# Patient Record
Sex: MALE | Race: WHITE | Employment: FULL TIME | URBAN - METROPOLITAN AREA
[De-identification: names, ages, dates, MRNs, and addresses within clinical notes are randomized per-mention and may not be internally consistent; named-entity substitution may affect disease eponyms.]

---

## 2017-01-30 ENCOUNTER — OFFICE VISIT (OUTPATIENT)
Dept: INTERNAL MEDICINE CLINIC | Age: 62
End: 2017-01-30

## 2017-01-30 VITALS
HEIGHT: 69 IN | HEART RATE: 74 BPM | BODY MASS INDEX: 34.96 KG/M2 | OXYGEN SATURATION: 95 % | WEIGHT: 236 LBS | DIASTOLIC BLOOD PRESSURE: 76 MMHG | SYSTOLIC BLOOD PRESSURE: 120 MMHG

## 2017-01-30 DIAGNOSIS — K21.9 GASTROESOPHAGEAL REFLUX DISEASE WITHOUT ESOPHAGITIS: Primary | ICD-10-CM

## 2017-01-30 DIAGNOSIS — J30.9 ALLERGIC RHINITIS, UNSPECIFIED ALLERGIC RHINITIS TRIGGER, UNSPECIFIED RHINITIS SEASONALITY: ICD-10-CM

## 2017-01-30 PROCEDURE — 99213 OFFICE O/P EST LOW 20 MIN: CPT | Performed by: INTERNAL MEDICINE

## 2017-01-30 RX ORDER — LORATADINE 10 MG/1
10 CAPSULE, LIQUID FILLED ORAL DAILY
COMMUNITY
End: 2020-12-14 | Stop reason: ALTCHOICE

## 2017-01-30 ASSESSMENT — ENCOUNTER SYMPTOMS
ABDOMINAL DISTENTION: 0
CHEST TIGHTNESS: 0
DIARRHEA: 0
CONSTIPATION: 0
ABDOMINAL PAIN: 1
NAUSEA: 0
SHORTNESS OF BREATH: 0
BACK PAIN: 0
VOMITING: 0
COUGH: 1
RHINORRHEA: 1
WHEEZING: 0
SINUS PRESSURE: 0

## 2017-01-30 ASSESSMENT — PATIENT HEALTH QUESTIONNAIRE - PHQ9
SUM OF ALL RESPONSES TO PHQ9 QUESTIONS 1 & 2: 0
2. FEELING DOWN, DEPRESSED OR HOPELESS: 0
SUM OF ALL RESPONSES TO PHQ QUESTIONS 1-9: 0
1. LITTLE INTEREST OR PLEASURE IN DOING THINGS: 0

## 2018-02-15 ENCOUNTER — TELEPHONE (OUTPATIENT)
Dept: INTERNAL MEDICINE CLINIC | Age: 63
End: 2018-02-15

## 2018-02-15 DIAGNOSIS — R05.9 COUGH: ICD-10-CM

## 2018-02-15 RX ORDER — ALBUTEROL SULFATE 90 UG/1
2 AEROSOL, METERED RESPIRATORY (INHALATION) EVERY 6 HOURS PRN
Qty: 1 INHALER | Refills: 3 | Status: SHIPPED | OUTPATIENT
Start: 2018-02-15 | End: 2020-04-01 | Stop reason: SDUPTHER

## 2018-09-28 ENCOUNTER — TELEPHONE (OUTPATIENT)
Dept: FAMILY MEDICINE CLINIC | Age: 63
End: 2018-09-28

## 2018-09-28 NOTE — TELEPHONE ENCOUNTER
Pt is asking to see you as a new patient. His wife sees you. He can only do a Monday morning. Can we schedule him as a new patient on a Monday morning?

## 2018-12-03 ENCOUNTER — OFFICE VISIT (OUTPATIENT)
Dept: FAMILY MEDICINE CLINIC | Age: 63
End: 2018-12-03
Payer: COMMERCIAL

## 2018-12-03 VITALS
OXYGEN SATURATION: 96 % | DIASTOLIC BLOOD PRESSURE: 60 MMHG | HEART RATE: 74 BPM | BODY MASS INDEX: 35.25 KG/M2 | HEIGHT: 69 IN | WEIGHT: 238 LBS | SYSTOLIC BLOOD PRESSURE: 118 MMHG

## 2018-12-03 DIAGNOSIS — J45.20 MILD INTERMITTENT ASTHMA WITHOUT COMPLICATION: ICD-10-CM

## 2018-12-03 DIAGNOSIS — K21.9 GASTROESOPHAGEAL REFLUX DISEASE WITHOUT ESOPHAGITIS: ICD-10-CM

## 2018-12-03 DIAGNOSIS — R73.01 IFG (IMPAIRED FASTING GLUCOSE): Primary | ICD-10-CM

## 2018-12-03 DIAGNOSIS — K22.70 BARRETT'S ESOPHAGUS WITHOUT DYSPLASIA: ICD-10-CM

## 2018-12-03 DIAGNOSIS — Z12.5 SCREENING FOR PROSTATE CANCER: ICD-10-CM

## 2018-12-03 DIAGNOSIS — J30.81 ALLERGIC RHINITIS DUE TO ANIMAL HAIR AND DANDER: ICD-10-CM

## 2018-12-03 DIAGNOSIS — Z85.46 HISTORY OF PROSTATE CANCER: ICD-10-CM

## 2018-12-03 DIAGNOSIS — Z23 NEED FOR PROPHYLACTIC VACCINATION AND INOCULATION AGAINST VARICELLA: ICD-10-CM

## 2018-12-03 LAB
A/G RATIO: 1.7 (ref 1.1–2.2)
ALBUMIN SERPL-MCNC: 4.3 G/DL (ref 3.4–5)
ALP BLD-CCNC: 80 U/L (ref 40–129)
ALT SERPL-CCNC: 20 U/L (ref 10–40)
ANION GAP SERPL CALCULATED.3IONS-SCNC: 15 MMOL/L (ref 3–16)
AST SERPL-CCNC: 15 U/L (ref 15–37)
BILIRUB SERPL-MCNC: <0.2 MG/DL (ref 0–1)
BUN BLDV-MCNC: 14 MG/DL (ref 7–20)
CALCIUM SERPL-MCNC: 9.2 MG/DL (ref 8.3–10.6)
CHLORIDE BLD-SCNC: 106 MMOL/L (ref 99–110)
CHOLESTEROL, TOTAL: 207 MG/DL (ref 0–199)
CO2: 22 MMOL/L (ref 21–32)
CREAT SERPL-MCNC: 0.9 MG/DL (ref 0.8–1.3)
GFR AFRICAN AMERICAN: >60
GFR NON-AFRICAN AMERICAN: >60
GLOBULIN: 2.5 G/DL
GLUCOSE BLD-MCNC: 124 MG/DL (ref 70–99)
HBA1C MFR BLD: 6.1 %
HCT VFR BLD CALC: 46.4 % (ref 40.5–52.5)
HDLC SERPL-MCNC: 58 MG/DL (ref 40–60)
HEMOGLOBIN: 15.8 G/DL (ref 13.5–17.5)
LDL CHOLESTEROL CALCULATED: 130 MG/DL
MCH RBC QN AUTO: 33 PG (ref 26–34)
MCHC RBC AUTO-ENTMCNC: 34.1 G/DL (ref 31–36)
MCV RBC AUTO: 96.8 FL (ref 80–100)
PDW BLD-RTO: 12.9 % (ref 12.4–15.4)
PLATELET # BLD: 194 K/UL (ref 135–450)
PMV BLD AUTO: 9 FL (ref 5–10.5)
POTASSIUM SERPL-SCNC: 4.7 MMOL/L (ref 3.5–5.1)
PROSTATE SPECIFIC ANTIGEN: 0.02 NG/ML (ref 0–4)
RBC # BLD: 4.79 M/UL (ref 4.2–5.9)
SODIUM BLD-SCNC: 143 MMOL/L (ref 136–145)
TOTAL PROTEIN: 6.8 G/DL (ref 6.4–8.2)
TRIGL SERPL-MCNC: 94 MG/DL (ref 0–150)
VLDLC SERPL CALC-MCNC: 19 MG/DL
WBC # BLD: 7.7 K/UL (ref 4–11)

## 2018-12-03 PROCEDURE — 83036 HEMOGLOBIN GLYCOSYLATED A1C: CPT | Performed by: INTERNAL MEDICINE

## 2018-12-03 PROCEDURE — 99214 OFFICE O/P EST MOD 30 MIN: CPT | Performed by: INTERNAL MEDICINE

## 2018-12-03 ASSESSMENT — PATIENT HEALTH QUESTIONNAIRE - PHQ9
SUM OF ALL RESPONSES TO PHQ QUESTIONS 1-9: 0
SUM OF ALL RESPONSES TO PHQ QUESTIONS 1-9: 0
SUM OF ALL RESPONSES TO PHQ9 QUESTIONS 1 & 2: 0
1. LITTLE INTEREST OR PLEASURE IN DOING THINGS: 0
2. FEELING DOWN, DEPRESSED OR HOPELESS: 0

## 2018-12-03 ASSESSMENT — ENCOUNTER SYMPTOMS
SHORTNESS OF BREATH: 0
COUGH: 1
NAUSEA: 0
ABDOMINAL PAIN: 0
WHEEZING: 1
ABDOMINAL DISTENTION: 0

## 2019-06-11 ENCOUNTER — HOSPITAL ENCOUNTER (EMERGENCY)
Age: 64
Discharge: HOME | End: 2019-06-11
Payer: COMMERCIAL

## 2019-06-11 ENCOUNTER — APPOINTMENT (OUTPATIENT)
Dept: GENERAL RADIOLOGY | Age: 64
End: 2019-06-11
Payer: COMMERCIAL

## 2019-06-11 VITALS
DIASTOLIC BLOOD PRESSURE: 87 MMHG | OXYGEN SATURATION: 98 % | TEMPERATURE: 98.3 F | HEIGHT: 69 IN | SYSTOLIC BLOOD PRESSURE: 153 MMHG | HEART RATE: 71 BPM | RESPIRATION RATE: 18 BRPM | WEIGHT: 220 LBS | BODY MASS INDEX: 32.58 KG/M2

## 2019-06-11 DIAGNOSIS — M25.522 LEFT ELBOW PAIN: ICD-10-CM

## 2019-06-11 DIAGNOSIS — M79.602 LEFT ARM PAIN: Primary | ICD-10-CM

## 2019-06-11 DIAGNOSIS — M54.10 RADICULOPATHY, UNSPECIFIED SPINAL REGION: ICD-10-CM

## 2019-06-11 PROCEDURE — 6370000000 HC RX 637 (ALT 250 FOR IP): Performed by: NURSE PRACTITIONER

## 2019-06-11 PROCEDURE — 99283 EMERGENCY DEPT VISIT LOW MDM: CPT

## 2019-06-11 PROCEDURE — 73080 X-RAY EXAM OF ELBOW: CPT

## 2019-06-11 RX ORDER — HYDROCODONE BITARTRATE AND ACETAMINOPHEN 5; 325 MG/1; MG/1
1 TABLET ORAL EVERY 6 HOURS PRN
Qty: 6 TABLET | Refills: 0 | Status: SHIPPED | OUTPATIENT
Start: 2019-06-11 | End: 2019-06-14

## 2019-06-11 RX ORDER — HYDROCODONE BITARTRATE AND ACETAMINOPHEN 7.5; 325 MG/1; MG/1
1 TABLET ORAL ONCE
Status: COMPLETED | OUTPATIENT
Start: 2019-06-11 | End: 2019-06-11

## 2019-06-11 RX ORDER — METHOCARBAMOL 500 MG/1
500 TABLET, FILM COATED ORAL 3 TIMES DAILY PRN
Qty: 20 TABLET | Refills: 0 | Status: SHIPPED | OUTPATIENT
Start: 2019-06-11 | End: 2019-06-21

## 2019-06-11 RX ADMIN — HYDROCODONE BITARTRATE AND ACETAMINOPHEN 1 TABLET: 7.5; 325 TABLET ORAL at 09:39

## 2019-06-11 ASSESSMENT — PAIN DESCRIPTION - PROGRESSION: CLINICAL_PROGRESSION: GRADUALLY IMPROVING

## 2019-06-11 ASSESSMENT — PAIN DESCRIPTION - ORIENTATION
ORIENTATION: LEFT
ORIENTATION: LEFT

## 2019-06-11 ASSESSMENT — PAIN SCALES - GENERAL
PAINLEVEL_OUTOF10: 5
PAINLEVEL_OUTOF10: 8
PAINLEVEL_OUTOF10: 8

## 2019-06-11 ASSESSMENT — PAIN DESCRIPTION - ONSET: ONSET: ON-GOING

## 2019-06-11 ASSESSMENT — PAIN DESCRIPTION - LOCATION: LOCATION: ELBOW

## 2019-06-11 ASSESSMENT — PAIN DESCRIPTION - FREQUENCY: FREQUENCY: CONTINUOUS

## 2019-06-11 ASSESSMENT — PAIN DESCRIPTION - PAIN TYPE: TYPE: ACUTE PAIN

## 2019-06-11 NOTE — ED NOTES
Patient significant other asked this RN if she could call Dr. Salina Swanson office and get an MRI. This RN explained to patient and patients significant other the proper procedure is to go thru [facility] prior. Patient significant other stated she had already called Dr. Salina Swanson office and informed his office this was a workmans comp claim and was instructed the office would return her call. This RN reiterated to follow up with [facility], call physical therapy for an appt. And follow up with patients workplace for further instructions. Patient and family verbalized understanding.      Jessica Lynch RN  06/11/19 [id number]

## 2019-06-11 NOTE — ED PROVIDER NOTES
[facility] Emergency Department    CHIEF COMPLAINT  Arm Pain (pt was lifting something last night and felt multiple pops in the left elbow- numbness pain and tingling from wrist to shoulder. )      HISTORY OF PRESENT ILLNESS  Anuradha Slaughter is a 60 y.o. male who presents to the ED complaining of left arm pain. Patient reports he was at work last evening lifting a bar over his head when he heard 3 \"pops\" in his left elbow. Patient reports since then he has been having a sharp shooting pain from his left wrist to his left shoulder. Patient also reports numbness, tingling, weakness in the left hand and fingers. Patient denies any other injury. Patient denies any aggravating or alleviating factors. Patient did not take anything for pain prior to arrival.  No other complaints, modifying factors or associated symptoms. Nursing notes reviewed. Past Medical History:   Diagnosis Date    Arevalo's esophagus 2010, 2014    EGD 9/10 Dr. Frederick Dykes GERD (gastroesophageal reflux disease)     Hiatal hernia     Hyperlipidemia     past hx    Kidney stone 2001    Prostate cancer [facility])     Urology group Dr. Bruce Ramos, s/p radioactive seed implants. also following with oncology. Past Surgical History:   Procedure Laterality Date    COLONOSCOPY  9/10    KIDNEY STONE SURGERY      PROSTATE SURGERY  approx 2011    prostate biopsy, radiation seeds    SHOULDER ARTHROSCOPY Right 12-2-15    RT SHOULDER DVA; SAD; ROTATOR CUFF REPAIR; BICEPS TENODESIS     UPPER GASTROINTESTINAL ENDOSCOPY  2012, 2014    Dr. Blank Acharya.   repeat 2017     Family History   Problem Relation Age of Onset    Cancer Mother         breast    Diabetes Mother     Diabetes Father     Hypertension Father     Parkinsonism Other      Social History     Socioeconomic History    Marital status:      Spouse name: Not on file    Number of children: Not on file    Years of education: Not on file    Highest education level: Not on file   Occupational History    Not on file   Social Needs    Financial resource strain: Not on file    Food insecurity:     Worry: Not on file     Inability: Not on file    Transportation needs:     Medical: Not on file     Non-medical: Not on file   Tobacco Use    Smoking status: Former Smoker     Packs/day: 1.00     Years: 20.00     Pack years: 20.00     Types: Cigarettes     Last attempt to quit: 1990     Years since quittin.5    Smokeless tobacco: Never Used   Substance and Sexual Activity    Alcohol use: Yes     Comment: 7-14 drinks per week    Drug use: No    Sexual activity: Not on file   Lifestyle    Physical activity:     Days per week: Not on file     Minutes per session: Not on file    Stress: Not on file   Relationships    Social connections:     Talks on phone: Not on file     Gets together: Not on file     Attends Bahai service: Not on file     Active member of club or organization: Not on file     Attends meetings of clubs or organizations: Not on file     Relationship status: Not on file    Intimate partner violence:     Fear of current or ex partner: Not on file     Emotionally abused: Not on file     Physically abused: Not on file     Forced sexual activity: Not on file   Other Topics Concern    Not on file   Social History Narrative    Not on file     No current facility-administered medications for this encounter. Current Outpatient Medications   Medication Sig Dispense Refill    methocarbamol (ROBAXIN) 500 MG tablet Take 1 tablet by mouth 3 times daily as needed (pain) 20 tablet 0    HYDROcodone-acetaminophen (NORCO) 5-325 MG per tablet Take 1 tablet by mouth every 6 hours as needed for Pain for up to 3 days. Intended supply: 3 days. Take lowest dose possible to manage pain 6 tablet 0    omeprazole (PRILOSEC) 40 MG capsule Take 40 mg by mouth daily.         albuterol sulfate  (90 Base) MCG/ACT inhaler Inhale 2 puffs into the lungs every 6 hours as needed for Wheezing or Shortness of Breath 1 Inhaler 3    loratadine (CLARITIN) 10 MG capsule Take 10 mg by mouth daily      B Complex-C (SUPER B COMPLEX PO) Take by mouth daily      CALCIUM-VITAMIN D PO Take by mouth daily      UNABLE TO FIND Take by mouth daily Reversitrol plus      folic acid (FOLVITE) 539 MCG tablet Take 400 mcg by mouth daily       Chromium Picolinate 500 MCG TABS Take 1 tablet by mouth daily.  Cinnamon 500 MG CAPS Take 2 capsules by mouth Daily        No Known Allergies    REVIEW OF SYSTEMS  6 systems reviewed, pertinent positives per HPI otherwise noted to be negative    PHYSICAL EXAM  BP (!) 153/87   Pulse 71   Temp 98.3 °F (36.8 °C) (Oral)   Resp 18   Ht 5' 8.5\" (1.74 m)   Wt 220 lb (99.8 kg)   SpO2 98%   BMI 32.96 kg/m²   GENERAL APPEARANCE: Awake and alert. Cooperative. No acute distress. HEAD: Normocephalic. Atraumatic. EYES: PERRL. EOM's grossly intact. ENT: Mucous membranes are moist.   NECK: Supple. Normal ROM. CHEST: Equal symmetric chest rise. LUNGS: Breathing is unlabored. Speaking comfortably in full sentences. Abdomen: Nondistended  EXTREMITIES: MAEE. No acute deformities. Left upper extremity no obvious deformity. No erythema, ecchymosis, edema, effusion. No abrasion or laceration. Pain is reproduced with palpation to the ulna medially. Strength is decreased to the left hand. Sensation is intact. Patient able to perform finger to thumb opposition. Ulnar, radial, medial nerves are intact. Patient able to perform active and passive range of motion. Cap refill less than 2 seconds. Distal pulses intact. Neurovascularly intact. SKIN: Warm and dry. NEUROLOGICAL: Alert and oriented. Strength is 4/5 in all extremities and sensation is intact. RADIOLOGY  Xr Elbow Left (min 3 Views)    Result Date: 6/11/2019  EXAMINATION: 3 XRAY VIEWS OF THE LEFT ELBOW 6/11/2019 9:11 am COMPARISON: None.  HISTORY: ORDERING SYSTEM PROVIDED HISTORY: hear a \"pop\" pain TECHNOLOGIST PROVIDED HISTORY: Reason for exam:->hear a \"pop\" pain 80-year-old female who heard a pop and has acute left elbow pain FINDINGS: Osseous alignment is normal.  Joint spaces are well maintained. No acute fracture or gross dislocation is seen. The radial head and radial neck appear intact. There is no significant elevation of the posterior fat pad or sail sign to suggest a joint effusion. No acute fracture or gross dislocation. ED COURSE  I have independently evaluated this patient per my scope of practice. My supervising physician was in the department as needed for consultation. Patient presents emergency department for evaluation of left elbow pain. X-ray of left elbow reveals no acute fracture or gross dislocation. Based on history of injury and patient's physical exam I believe patient is having radicular pain after an elbow sprain. Patient provided with [facility] for follow-up. Patient given Sommer Said in the emergency department. Strict return precautions discussed. Patient instructed to return to the emergency department with new or worsening symptoms. Patient is agreeable with plan of care and denies any questions at this time. Patient was sent home with a prescription for robaxin and norco.    MDM    No results found for this visit on 06/11/19. I estimate there is LOW risk for COMPARTMENT SYNDROME, DEEP VENOUS THROMBOSIS, SEPTIC ARTHRITIS, TENDON OR NEUROVASCULAR INJURY, thus I consider the discharge disposition reasonable. Nikki Murguia and I have discussed the diagnosis and risks, and we agree with discharging home to follow-up with their primary doctor or the referral orthopedist. We also discussed returning to the Emergency Department immediately if new or worsening symptoms occur.  We have discussed the symptoms which are most concerning (e.g., changing or worsening pain, numbness, weakness) that necessitate immediate return. Final Impression    1. Left arm pain    2. Radiculopathy, unspecified spinal region    3. Left elbow pain        Blood pressure (!) 153/87, pulse 71, temperature 98.3 °F (36.8 °C), temperature source Oral, resp. rate 18, height 5' 8.5\" (1.74 m), weight 220 lb (99.8 kg), SpO2 98 %. DISPOSITION  Patient was discharged to home in good condition.        Consuelo Gama, ANNY - ALONZO  06/11/19 1000

## 2019-06-13 ENCOUNTER — OFFICE VISIT (OUTPATIENT)
Dept: ORTHOPEDIC SURGERY | Age: 64
End: 2019-06-13
Payer: COMMERCIAL

## 2019-06-13 VITALS — HEIGHT: 69 IN | WEIGHT: 220.02 LBS | BODY MASS INDEX: 32.59 KG/M2

## 2019-06-13 DIAGNOSIS — S46.212A STRAIN OF LEFT BICEPS, INITIAL ENCOUNTER: ICD-10-CM

## 2019-06-13 DIAGNOSIS — S46.212A RUPTURE OF LEFT DISTAL BICEPS TENDON, INITIAL ENCOUNTER: Primary | ICD-10-CM

## 2019-06-13 PROCEDURE — 99243 OFF/OP CNSLTJ NEW/EST LOW 30: CPT | Performed by: ORTHOPAEDIC SURGERY

## 2019-06-13 NOTE — PROGRESS NOTES
SHOULDER CONSULTATION    Referring Provider: Dr. Amrit Gloria    Primary Care Provider: Same    Chief Complaint    Elbow Pain (LEFT ELBOW)      History of Present Illness:  Nicci Johnson is a 60 y.o. male who is right-hand dominant but works ambidextrous. He does have a history of some rotator cuff problems in the past.  On Monday he was using a large heavy pole holding his hand supinated and extended. He felt a pop in the antecubital fossa. In fact he describes 3 distinct pops. The feeling of getting hit by hammer. At that time he had trouble flexing and supinating the elbow. He had vague numbness and tingling in the forearm. Symptoms are somewhat improving but he remains weak and concerned    Pain Assessment  Location of Pain: Elbow  Location Modifiers: Left  Severity of Pain: 8    Medical History:  Patient's medications, allergies, past medical, surgical, social and family histories were reviewed and updated as appropriate. Review of Systems:  Pertinent items are noted in HPI  Review of systems reviewed from Patient History Form dated on June 13, 2019 and available in the patient's chart under the Media tab. Vital Signs:  Ht 5' 8.5\" (1.74 m)   Wt 220 lb 0.3 oz (99.8 kg)   BMI 32.96 kg/m²       General Exam:   Constitutional: Patient is adequately groomed with no evidence of malnutrition  Mental Status: The patient is oriented to time, place and person. The patient's mood and affect are appropriate. Vascular: Examination reveals no swelling or calf tenderness. Peripheral pulses are palpable and 2+. Neurological: The patient has good coordination. There is no weakness or sensory deficit.     Elbow examination:    Inspection: He has swelling in the antecubital fossa with deformity of the distal biceps muscle contour    Palpation: Tenderness in the antecubital fossa and proximal radius    Range of Motion: Full joint range of motion and pronation, supination, flexion and extension    Strength: Pain inhibited flexion and supination strength    Special Tests: Positive hook test    Skin: There are no rashes, ulcerations or lesions. Gait: Stable with no assistive device     Spine full cervical rotation    Additional Comments:         Radiology:     Plain x-rays obtained and reviewed in the office today include 3 views of the elbow. This reveals normal osseous alignment. No fractures or malalignment      Assessment : My clinical impression is that of a distal biceps rupture. Office Procedures:  No orders of the defined types were placed in this encounter. Treatment Plan: We explained the anatomy and mechanics of the elbow. I have recommended that we proceed with an MRI to confirm the diagnosis or assess a partial-thickness tear. This will help direct his plan of treatment and prognosis. Presently he is unable to perform his job and will remain off work. We appreciate the opportunity to care for this patient. The trust that is implicit in this referral does not go unnoticed. We will do our very best to provide high-quality care that goes above and beyond standards. Please feel free contact us with any questions or concerns about this patient.               [address] [facility] Shane Partner of [facility] and Sports Medicine Surgery

## 2019-06-14 ENCOUNTER — HOSPITAL ENCOUNTER (OUTPATIENT)
Dept: MRI IMAGING | Age: 64
Discharge: HOME | End: 2019-06-14
Payer: COMMERCIAL

## 2019-06-14 DIAGNOSIS — S46.212A RUPTURE OF LEFT DISTAL BICEPS TENDON, INITIAL ENCOUNTER: ICD-10-CM

## 2019-06-14 PROCEDURE — 73221 MRI JOINT UPR EXTREM W/O DYE: CPT

## 2019-06-18 ENCOUNTER — OFFICE VISIT (OUTPATIENT)
Dept: ORTHOPEDIC SURGERY | Age: 64
End: 2019-06-18
Payer: COMMERCIAL

## 2019-06-18 VITALS — WEIGHT: 220 LBS | HEIGHT: 69 IN | BODY MASS INDEX: 32.58 KG/M2

## 2019-06-18 DIAGNOSIS — S46.212A RUPTURE OF LEFT DISTAL BICEPS TENDON, INITIAL ENCOUNTER: Primary | ICD-10-CM

## 2019-06-18 PROCEDURE — 99213 OFFICE O/P EST LOW 20 MIN: CPT | Performed by: ORTHOPAEDIC SURGERY

## 2019-06-18 PROCEDURE — L3670 SO ACRO/CLAV CAN WEB PRE OTS: HCPCS | Performed by: ORTHOPAEDIC SURGERY

## 2019-06-18 NOTE — LETTER
[facility] [facility]   Surgery Precert & Billing Form:    DEMOGRAPHICS:                                                                                                       Patient Name:  Mercedes Tineo  Patient :  1955   Patient SS#:      Patient Phone:  [phone] (home) [phone] (work) Alt. Patient Phone:    Patient Address:  [place] [id number]    PCP:  Alfred Lima MD  Insurance: Payor: [employer] / Plan: [employer] / Product Type: *No Product type* /   DIAGNOSIS & PROCEDURE:                                                                                      Diagnosis:   1.  Rupture of left distal biceps tendon, initial encounter      Operation: left    SURGERY  INFORMATION  Date of Surgery:   19  Location:  [facility]   Type:    Outpatient  23 hour hold:  No  Surgeon:              John Bateman MD      19     BILLING INFORMATION:                                                                                                Physician Procedure                                            CPT Codes        Left distal bicep repair  96410                PA, or Fellow Procedure                                      CPT Codes                           Precert information:
? Cefazolin 3 grams if ?120kg (?264 lbs. )  ? Pediatric(<12yo) Dosinmg/kg (maximum 2 grams)   If allergic to PCN/Cephalosporin, positive MRSA/history or ? 72  age (risk of C-difficile):       [] Vancomycin IVPB per weight base protocol  ? Vancomycin 1 gm if < 80kg (<176 lbs. )  ? Vancomycin 1.5 gm if ?80kg to <120kg (?176 to <264 lbs. )  ? Vancomycin 2 gm if  if ?120kg (?264 lbs.)   ADDITIONAL MEDICATIONS:    [x] OFIRMEV IVPB 1gm over 15 minutes (Adjust to body weight when needed)       Administer in pre-op. []  EXPAREL 1.3%  20 cc  Subcutaneous    **ANDREA AND ISAK ONLY**  []  Ortho Irrigation: Bacitracin 50,999 units and Polymixin B 500,000 unites mixed in a syringe.  OR to mix with 500 cc NS and use 200 cc for irrigation (FOR ALL PATIENTS WHO REQUIRED METAL IMPLANT OR GRAFT)   DVT PREVENTION:  Antithrombic wraps (Age 16 & older)  [] Thigh High  []  Knee high            []Bilateral    [] Right     []  Left  [x]  Knee high DANIEL Hose           Signature                                                 Date 19 1:44 PM                                             Mir Mathews M.D                                                                                                 Scheduled By:  Lyric Campbell 19   Direct Tel: [phone]                                      Direct Fax: [phone] Office: [phone] Ext [phone]

## 2019-06-21 ENCOUNTER — OFFICE VISIT (OUTPATIENT)
Dept: FAMILY MEDICINE CLINIC | Age: 64
End: 2019-06-21
Payer: COMMERCIAL

## 2019-06-21 VITALS
TEMPERATURE: 98.1 F | OXYGEN SATURATION: 98 % | SYSTOLIC BLOOD PRESSURE: 106 MMHG | BODY MASS INDEX: 35.96 KG/M2 | DIASTOLIC BLOOD PRESSURE: 64 MMHG | RESPIRATION RATE: 16 BRPM | HEART RATE: 83 BPM | HEIGHT: 69 IN | WEIGHT: 242.8 LBS

## 2019-06-21 DIAGNOSIS — Z01.818 PRE-OP EXAM: Primary | ICD-10-CM

## 2019-06-21 DIAGNOSIS — S46.212S RUPTURE OF LEFT BICEPS TENDON, SEQUELA: ICD-10-CM

## 2019-06-21 DIAGNOSIS — R73.01 IMPAIRED FASTING GLUCOSE: ICD-10-CM

## 2019-06-21 LAB
ALBUMIN SERPL-MCNC: 4.6 G/DL (ref 3.4–5)
ANION GAP SERPL CALCULATED.3IONS-SCNC: 12 MMOL/L (ref 3–16)
BUN BLDV-MCNC: 19 MG/DL (ref 7–20)
CALCIUM SERPL-MCNC: 9.8 MG/DL (ref 8.3–10.6)
CHLORIDE BLD-SCNC: 103 MMOL/L (ref 99–110)
CO2: 25 MMOL/L (ref 21–32)
CREAT SERPL-MCNC: 1 MG/DL (ref 0.8–1.3)
GFR AFRICAN AMERICAN: >60
GFR NON-AFRICAN AMERICAN: >60
GLUCOSE BLD-MCNC: 143 MG/DL (ref 70–99)
HCT VFR BLD CALC: 46.7 % (ref 40.5–52.5)
HEMOGLOBIN: 16.2 G/DL (ref 13.5–17.5)
MCH RBC QN AUTO: 33.7 PG (ref 26–34)
MCHC RBC AUTO-ENTMCNC: 34.7 G/DL (ref 31–36)
MCV RBC AUTO: 97 FL (ref 80–100)
PDW BLD-RTO: 13 % (ref 12.4–15.4)
PHOSPHORUS: 3.8 MG/DL (ref 2.5–4.9)
PLATELET # BLD: 208 K/UL (ref 135–450)
PMV BLD AUTO: 8.9 FL (ref 5–10.5)
POTASSIUM SERPL-SCNC: 4.4 MMOL/L (ref 3.5–5.1)
RBC # BLD: 4.81 M/UL (ref 4.2–5.9)
SODIUM BLD-SCNC: 140 MMOL/L (ref 136–145)
WBC # BLD: 10.2 K/UL (ref 4–11)

## 2019-06-21 PROCEDURE — 93000 ELECTROCARDIOGRAM COMPLETE: CPT | Performed by: PHYSICIAN ASSISTANT

## 2019-06-21 PROCEDURE — 99213 OFFICE O/P EST LOW 20 MIN: CPT | Performed by: PHYSICIAN ASSISTANT

## 2019-06-21 ASSESSMENT — PATIENT HEALTH QUESTIONNAIRE - PHQ9
SUM OF ALL RESPONSES TO PHQ9 QUESTIONS 1 & 2: 0
SUM OF ALL RESPONSES TO PHQ QUESTIONS 1-9: 0
2. FEELING DOWN, DEPRESSED OR HOPELESS: 0
1. LITTLE INTEREST OR PLEASURE IN DOING THINGS: 0
SUM OF ALL RESPONSES TO PHQ QUESTIONS 1-9: 0

## 2019-06-21 NOTE — PROGRESS NOTES
(gastroesophageal reflux disease)    Shoulder impingement    Biceps muscle tear    Rotator cuff tear    Mild intermittent asthma without complication    Allergic rhinitis due to animal hair and dander    IFG (impaired fasting glucose)       Past Medical History:   Diagnosis Date    Arevalo's esophagus ,     EGD 9/10 Dr. Cesilia Harrell GERD (gastroesophageal reflux disease)     Hiatal hernia     Hyperlipidemia     past hx    Kidney stone     Prostate cancer [facility])     Urology group Dr. Jorge L Epstein, s/p radioactive seed implants. also following with oncology. Past Surgical History:   Procedure Laterality Date    COLONOSCOPY  9/10    KIDNEY STONE SURGERY      PROSTATE SURGERY  approx     prostate biopsy, radiation seeds    SHOULDER ARTHROSCOPY Right 12-2-15    RT SHOULDER DVA; SAD; ROTATOR CUFF REPAIR; BICEPS TENODESIS     UPPER GASTROINTESTINAL ENDOSCOPY  ,     Dr. Nayely Fleming.   repeat 2017     Family History   Problem Relation Age of Onset    Cancer Mother         breast    Diabetes Mother     Diabetes Father     Hypertension Father     Parkinsonism Other      Social History     Socioeconomic History    Marital status:      Spouse name: Not on file    Number of children: Not on file    Years of education: Not on file    Highest education level: Not on file   Occupational History    Not on file   Social Needs    Financial resource strain: Not on file    Food insecurity:     Worry: Not on file     Inability: Not on file    Transportation needs:     Medical: Not on file     Non-medical: Not on file   Tobacco Use    Smoking status: Former Smoker     Packs/day: 1.00     Years: 20.00     Pack years: 20.00     Types: Cigarettes     Last attempt to quit: 1990     Years since quittin.5    Smokeless tobacco: Never Used   Substance and Sexual Activity    Alcohol use: Yes     Comment: 7-14 drinks per week    Drug use: No    Sexual activity: Not on file   Lifestyle  Physical activity:     Days per week: Not on file     Minutes per session: Not on file    Stress: Not on file   Relationships    Social connections:     Talks on phone: Not on file     Gets together: Not on file     Attends Alevism service: Not on file     Active member of club or organization: Not on file     Attends meetings of clubs or organizations: Not on file     Relationship status: Not on file    Intimate partner violence:     Fear of current or ex partner: Not on file     Emotionally abused: Not on file     Physically abused: Not on file     Forced sexual activity: Not on file   Other Topics Concern    Not on file   Social History Narrative    Not on file       Review of Systems  A comprehensive review of systems was negative except for what was noted in the HPI. Physical Exam   Constitutional: He is oriented to person, place, and time. He appears well-developed and well-nourished. No distress. HENT:   Head: Normocephalic and atraumatic. Mouth/Throat: Uvula is midline, oropharynx is clear and moist and mucous membranes are normal.   Eyes: Conjunctivae and EOM are normal. Pupils are equal, round, and reactive to light. Neck: Trachea normal and normal range of motion. Neck supple. No JVD present. Carotid bruit is not present. No mass and no thyromegaly present. Cardiovascular: Normal rate, regular rhythm, normal heart sounds and intact distal pulses. Exam reveals no gallop and no friction rub. No murmur heard. Pulmonary/Chest: Effort normal and breath sounds normal. No respiratory distress. He has no wheezes. He has no rales. Abdominal: Soft. Normal aorta and bowel sounds are normal. He exhibits no distension and no mass. There is no hepatosplenomegaly. No tenderness. Musculoskeletal: He exhibits no edema and no tenderness. Neurological: He is alert and oriented to person, place, and time. He has normal strength. No cranial nerve deficit or sensory deficit.  Coordination and gait normal.   Skin: Skin is warm and dry. No rash noted. No erythema. Psychiatric: He has a normal mood and affect. His behavior is normal.     EKG Interpretation:  normal EKG, normal sinus rhythm, unchanged from previous tracings. Lab Review   No visits with results within 6 Month(s) from this visit. Latest known visit with results is:   Office Visit on 12/03/2018   Component Date Value    Hemoglobin A1C 12/03/2018 6.1     PSA 12/03/2018 0.02     WBC 12/03/2018 7.7     RBC 12/03/2018 4.79     Hemoglobin 12/03/2018 15.8     Hematocrit 12/03/2018 46.4     MCV 12/03/2018 96.8     MCH 12/03/2018 33.0     MCHC 12/03/2018 34.1     RDW 12/03/2018 12.9     Platelets 54/91/2535 194     MPV 12/03/2018 9.0     Cholesterol, Total 12/03/2018 207*    Triglycerides 12/03/2018 94     HDL 12/03/2018 58     LDL Calculated 12/03/2018 130*    VLDL Cholesterol Calcula* 12/03/2018 19     Sodium 12/03/2018 143     Potassium 12/03/2018 4.7     Chloride 12/03/2018 106     CO2 12/03/2018 22     Anion Gap 12/03/2018 15     Glucose 12/03/2018 124*    BUN 12/03/2018 14     CREATININE 12/03/2018 0.9     GFR Non- 12/03/2018 >60     GFR  12/03/2018 >60     Calcium 12/03/2018 9.2     Total Protein 12/03/2018 6.8     Alb 12/03/2018 4.3     Albumin/Globulin Ratio 12/03/2018 1.7     Total Bilirubin 12/03/2018 <0.2     Alkaline Phosphatase 12/03/2018 80     ALT 12/03/2018 20     AST 12/03/2018 15     Globulin 12/03/2018 2.5            Assessment:       60 y.o. patient with planned surgery as above. Known risk factors for perioperative complications: None  Current medications which may produce withdrawal symptoms if withheld perioperatively: none      Plan:     1. Preoperative workup as follows: ECG, hemoglobin, hematocrit, electrolytes, creatinine, glucose  2.  Change in medication regimen before surgery: Take omeprazole on morning of surgery with sip of water, and hold all

## 2019-06-22 LAB
ESTIMATED AVERAGE GLUCOSE: 142.7 MG/DL
HBA1C MFR BLD: 6.6 %

## 2019-06-25 ENCOUNTER — ANESTHESIA EVENT (OUTPATIENT)
Dept: OPERATING ROOM | Age: 64
End: 2019-06-25
Payer: COMMERCIAL

## 2019-06-26 ENCOUNTER — ANESTHESIA (OUTPATIENT)
Dept: OPERATING ROOM | Age: 64
End: 2019-06-26
Payer: COMMERCIAL

## 2019-06-26 ENCOUNTER — HOSPITAL ENCOUNTER (OUTPATIENT)
Age: 64
Setting detail: OUTPATIENT SURGERY
Discharge: HOME | End: 2019-06-26
Attending: ORTHOPAEDIC SURGERY | Admitting: ORTHOPAEDIC SURGERY
Payer: COMMERCIAL

## 2019-06-26 VITALS
WEIGHT: 242 LBS | TEMPERATURE: 97.7 F | HEIGHT: 69 IN | SYSTOLIC BLOOD PRESSURE: 111 MMHG | DIASTOLIC BLOOD PRESSURE: 57 MMHG | HEART RATE: 71 BPM | RESPIRATION RATE: 24 BRPM | BODY MASS INDEX: 35.84 KG/M2 | OXYGEN SATURATION: 92 %

## 2019-06-26 VITALS
DIASTOLIC BLOOD PRESSURE: 72 MMHG | TEMPERATURE: 97.7 F | RESPIRATION RATE: 2 BRPM | SYSTOLIC BLOOD PRESSURE: 154 MMHG | OXYGEN SATURATION: 96 %

## 2019-06-26 DIAGNOSIS — S46.212A TEAR OF LEFT BICEPS MUSCLE, INITIAL ENCOUNTER: Primary | ICD-10-CM

## 2019-06-26 PROCEDURE — 6360000002 HC RX W HCPCS: Performed by: NURSE ANESTHETIST, CERTIFIED REGISTERED

## 2019-06-26 PROCEDURE — C1713 ANCHOR/SCREW BN/BN,TIS/BN: HCPCS | Performed by: ORTHOPAEDIC SURGERY

## 2019-06-26 PROCEDURE — 2709999900 HC NON-CHARGEABLE SUPPLY: Performed by: ORTHOPAEDIC SURGERY

## 2019-06-26 PROCEDURE — 7100000000 HC PACU RECOVERY - FIRST 15 MIN: Performed by: ORTHOPAEDIC SURGERY

## 2019-06-26 PROCEDURE — 7100000010 HC PHASE II RECOVERY - FIRST 15 MIN: Performed by: ORTHOPAEDIC SURGERY

## 2019-06-26 PROCEDURE — 6360000002 HC RX W HCPCS: Performed by: ORTHOPAEDIC SURGERY

## 2019-06-26 PROCEDURE — 2500000003 HC RX 250 WO HCPCS: Performed by: ANESTHESIOLOGY

## 2019-06-26 PROCEDURE — 6360000002 HC RX W HCPCS: Performed by: ANESTHESIOLOGY

## 2019-06-26 PROCEDURE — 3700000000 HC ANESTHESIA ATTENDED CARE: Performed by: ORTHOPAEDIC SURGERY

## 2019-06-26 PROCEDURE — 7100000011 HC PHASE II RECOVERY - ADDTL 15 MIN: Performed by: ORTHOPAEDIC SURGERY

## 2019-06-26 PROCEDURE — 3700000001 HC ADD 15 MINUTES (ANESTHESIA): Performed by: ORTHOPAEDIC SURGERY

## 2019-06-26 PROCEDURE — 3600000003 HC SURGERY LEVEL 3 BASE: Performed by: ORTHOPAEDIC SURGERY

## 2019-06-26 PROCEDURE — 7100000001 HC PACU RECOVERY - ADDTL 15 MIN: Performed by: ORTHOPAEDIC SURGERY

## 2019-06-26 PROCEDURE — 2580000003 HC RX 258: Performed by: ORTHOPAEDIC SURGERY

## 2019-06-26 PROCEDURE — 64415 NJX AA&/STRD BRCH PLXS IMG: CPT | Performed by: ANESTHESIOLOGY

## 2019-06-26 PROCEDURE — 2580000003 HC RX 258: Performed by: ANESTHESIOLOGY

## 2019-06-26 PROCEDURE — 3600000013 HC SURGERY LEVEL 3 ADDTL 15MIN: Performed by: ORTHOPAEDIC SURGERY

## 2019-06-26 PROCEDURE — 2500000003 HC RX 250 WO HCPCS: Performed by: NURSE ANESTHETIST, CERTIFIED REGISTERED

## 2019-06-26 DEVICE — SYSTEM IMPL DST BICEPS REP DEL W/ BICEPS BTTN 7X10MM PEEK: Type: IMPLANTABLE DEVICE | Site: ARM | Status: FUNCTIONAL

## 2019-06-26 RX ORDER — MIDAZOLAM HYDROCHLORIDE 1 MG/ML
INJECTION INTRAMUSCULAR; INTRAVENOUS PRN
Status: DISCONTINUED | OUTPATIENT
Start: 2019-06-26 | End: 2019-06-26 | Stop reason: SDUPTHER

## 2019-06-26 RX ORDER — PROMETHAZINE HYDROCHLORIDE 25 MG/ML
6.25 INJECTION, SOLUTION INTRAMUSCULAR; INTRAVENOUS
Status: DISCONTINUED | OUTPATIENT
Start: 2019-06-26 | End: 2019-06-26 | Stop reason: HOSPADM

## 2019-06-26 RX ORDER — OXYCODONE HYDROCHLORIDE AND ACETAMINOPHEN 5; 325 MG/1; MG/1
1 TABLET ORAL EVERY 6 HOURS PRN
Qty: 28 TABLET | Refills: 0 | Status: SHIPPED | OUTPATIENT
Start: 2019-06-26 | End: 2019-07-03

## 2019-06-26 RX ORDER — MIDAZOLAM HYDROCHLORIDE 1 MG/ML
INJECTION INTRAMUSCULAR; INTRAVENOUS
Status: COMPLETED
Start: 2019-06-26 | End: 2019-06-26

## 2019-06-26 RX ORDER — LIDOCAINE HYDROCHLORIDE 10 MG/ML
0.3 INJECTION, SOLUTION EPIDURAL; INFILTRATION; INTRACAUDAL; PERINEURAL
Status: DISCONTINUED | OUTPATIENT
Start: 2019-06-26 | End: 2019-06-26 | Stop reason: HOSPADM

## 2019-06-26 RX ORDER — MEPERIDINE HYDROCHLORIDE 50 MG/ML
12.5 INJECTION INTRAMUSCULAR; INTRAVENOUS; SUBCUTANEOUS EVERY 5 MIN PRN
Status: DISCONTINUED | OUTPATIENT
Start: 2019-06-26 | End: 2019-06-26 | Stop reason: HOSPADM

## 2019-06-26 RX ORDER — DIPHENHYDRAMINE HYDROCHLORIDE 50 MG/ML
12.5 INJECTION INTRAMUSCULAR; INTRAVENOUS
Status: DISCONTINUED | OUTPATIENT
Start: 2019-06-26 | End: 2019-06-26 | Stop reason: HOSPADM

## 2019-06-26 RX ORDER — LABETALOL HYDROCHLORIDE 5 MG/ML
5 INJECTION, SOLUTION INTRAVENOUS EVERY 10 MIN PRN
Status: DISCONTINUED | OUTPATIENT
Start: 2019-06-26 | End: 2019-06-26 | Stop reason: HOSPADM

## 2019-06-26 RX ORDER — ACETAMINOPHEN 10 MG/ML
1000 INJECTION, SOLUTION INTRAVENOUS ONCE
Status: COMPLETED | OUTPATIENT
Start: 2019-06-26 | End: 2019-06-26

## 2019-06-26 RX ORDER — SODIUM CHLORIDE, SODIUM LACTATE, POTASSIUM CHLORIDE, CALCIUM CHLORIDE 600; 310; 30; 20 MG/100ML; MG/100ML; MG/100ML; MG/100ML
INJECTION, SOLUTION INTRAVENOUS CONTINUOUS
Status: DISCONTINUED | OUTPATIENT
Start: 2019-06-26 | End: 2019-06-26 | Stop reason: HOSPADM

## 2019-06-26 RX ORDER — LIDOCAINE HYDROCHLORIDE 20 MG/ML
INJECTION, SOLUTION INFILTRATION; PERINEURAL PRN
Status: DISCONTINUED | OUTPATIENT
Start: 2019-06-26 | End: 2019-06-26 | Stop reason: SDUPTHER

## 2019-06-26 RX ORDER — MORPHINE SULFATE 2 MG/ML
1 INJECTION, SOLUTION INTRAMUSCULAR; INTRAVENOUS EVERY 5 MIN PRN
Status: DISCONTINUED | OUTPATIENT
Start: 2019-06-26 | End: 2019-06-26 | Stop reason: HOSPADM

## 2019-06-26 RX ORDER — DEXAMETHASONE SODIUM PHOSPHATE 10 MG/ML
INJECTION INTRAMUSCULAR; INTRAVENOUS PRN
Status: DISCONTINUED | OUTPATIENT
Start: 2019-06-26 | End: 2019-06-26 | Stop reason: SDUPTHER

## 2019-06-26 RX ORDER — SODIUM CHLORIDE 0.9 % (FLUSH) 0.9 %
10 SYRINGE (ML) INJECTION PRN
Status: DISCONTINUED | OUTPATIENT
Start: 2019-06-26 | End: 2019-06-26 | Stop reason: HOSPADM

## 2019-06-26 RX ORDER — OXYCODONE HYDROCHLORIDE AND ACETAMINOPHEN 5; 325 MG/1; MG/1
1 TABLET ORAL PRN
Status: DISCONTINUED | OUTPATIENT
Start: 2019-06-26 | End: 2019-06-26 | Stop reason: HOSPADM

## 2019-06-26 RX ORDER — MORPHINE SULFATE 2 MG/ML
2 INJECTION, SOLUTION INTRAMUSCULAR; INTRAVENOUS EVERY 5 MIN PRN
Status: DISCONTINUED | OUTPATIENT
Start: 2019-06-26 | End: 2019-06-26 | Stop reason: HOSPADM

## 2019-06-26 RX ORDER — PROPOFOL 10 MG/ML
INJECTION, EMULSION INTRAVENOUS PRN
Status: DISCONTINUED | OUTPATIENT
Start: 2019-06-26 | End: 2019-06-26 | Stop reason: SDUPTHER

## 2019-06-26 RX ORDER — OXYCODONE HYDROCHLORIDE AND ACETAMINOPHEN 5; 325 MG/1; MG/1
2 TABLET ORAL PRN
Status: DISCONTINUED | OUTPATIENT
Start: 2019-06-26 | End: 2019-06-26 | Stop reason: HOSPADM

## 2019-06-26 RX ORDER — ROCURONIUM BROMIDE 10 MG/ML
INJECTION, SOLUTION INTRAVENOUS PRN
Status: DISCONTINUED | OUTPATIENT
Start: 2019-06-26 | End: 2019-06-26 | Stop reason: SDUPTHER

## 2019-06-26 RX ORDER — ONDANSETRON 2 MG/ML
INJECTION INTRAMUSCULAR; INTRAVENOUS PRN
Status: DISCONTINUED | OUTPATIENT
Start: 2019-06-26 | End: 2019-06-26 | Stop reason: SDUPTHER

## 2019-06-26 RX ORDER — HYDRALAZINE HYDROCHLORIDE 20 MG/ML
5 INJECTION INTRAMUSCULAR; INTRAVENOUS EVERY 10 MIN PRN
Status: DISCONTINUED | OUTPATIENT
Start: 2019-06-26 | End: 2019-06-26 | Stop reason: HOSPADM

## 2019-06-26 RX ORDER — MAGNESIUM HYDROXIDE 1200 MG/15ML
LIQUID ORAL CONTINUOUS PRN
Status: COMPLETED | OUTPATIENT
Start: 2019-06-26 | End: 2019-06-26

## 2019-06-26 RX ORDER — SODIUM CHLORIDE 0.9 % (FLUSH) 0.9 %
10 SYRINGE (ML) INJECTION EVERY 12 HOURS SCHEDULED
Status: DISCONTINUED | OUTPATIENT
Start: 2019-06-26 | End: 2019-06-26 | Stop reason: HOSPADM

## 2019-06-26 RX ORDER — BUPIVACAINE HYDROCHLORIDE AND EPINEPHRINE 5; 5 MG/ML; UG/ML
INJECTION, SOLUTION EPIDURAL; INTRACAUDAL; PERINEURAL PRN
Status: DISCONTINUED | OUTPATIENT
Start: 2019-06-26 | End: 2019-06-26 | Stop reason: SDUPTHER

## 2019-06-26 RX ORDER — ONDANSETRON 2 MG/ML
4 INJECTION INTRAMUSCULAR; INTRAVENOUS PRN
Status: DISCONTINUED | OUTPATIENT
Start: 2019-06-26 | End: 2019-06-26 | Stop reason: HOSPADM

## 2019-06-26 RX ADMIN — PROPOFOL 200 MG: 10 INJECTION, EMULSION INTRAVENOUS at 14:05

## 2019-06-26 RX ADMIN — PHENYLEPHRINE HYDROCHLORIDE 200 MCG: 10 INJECTION INTRAVENOUS at 14:19

## 2019-06-26 RX ADMIN — ONDANSETRON 4 MG: 2 INJECTION INTRAMUSCULAR; INTRAVENOUS at 14:05

## 2019-06-26 RX ADMIN — PHENYLEPHRINE HYDROCHLORIDE 100 MCG: 10 INJECTION INTRAVENOUS at 14:16

## 2019-06-26 RX ADMIN — PHENYLEPHRINE HYDROCHLORIDE 200 MCG: 10 INJECTION INTRAVENOUS at 14:30

## 2019-06-26 RX ADMIN — SUGAMMADEX 250 MG: 100 INJECTION, SOLUTION INTRAVENOUS at 14:40

## 2019-06-26 RX ADMIN — BUPIVACAINE HYDROCHLORIDE AND EPINEPHRINE 30 ML: 5; 5 INJECTION, SOLUTION EPIDURAL; INTRACAUDAL; PERINEURAL at 13:24

## 2019-06-26 RX ADMIN — ROCURONIUM BROMIDE 50 MG: 10 SOLUTION INTRAVENOUS at 14:05

## 2019-06-26 RX ADMIN — PHENYLEPHRINE HYDROCHLORIDE 200 MCG: 10 INJECTION INTRAVENOUS at 14:37

## 2019-06-26 RX ADMIN — DEXAMETHASONE SODIUM PHOSPHATE 10 MG: 10 INJECTION INTRAMUSCULAR; INTRAVENOUS at 14:05

## 2019-06-26 RX ADMIN — ACETAMINOPHEN 1000 MG: 10 INJECTION, SOLUTION INTRAVENOUS at 11:22

## 2019-06-26 RX ADMIN — MIDAZOLAM HYDROCHLORIDE 2 MG: 2 INJECTION, SOLUTION INTRAMUSCULAR; INTRAVENOUS at 13:12

## 2019-06-26 RX ADMIN — LIDOCAINE HYDROCHLORIDE 60 MG: 20 INJECTION, SOLUTION INFILTRATION; PERINEURAL at 14:05

## 2019-06-26 RX ADMIN — SODIUM CHLORIDE, POTASSIUM CHLORIDE, SODIUM LACTATE AND CALCIUM CHLORIDE: 600; 310; 30; 20 INJECTION, SOLUTION INTRAVENOUS at 11:10

## 2019-06-26 ASSESSMENT — PULMONARY FUNCTION TESTS
PIF_VALUE: 25
PIF_VALUE: 0
PIF_VALUE: 24
PIF_VALUE: 25
PIF_VALUE: 23
PIF_VALUE: 1
PIF_VALUE: 24
PIF_VALUE: 7
PIF_VALUE: 24
PIF_VALUE: 24
PIF_VALUE: 2
PIF_VALUE: 24
PIF_VALUE: 24
PIF_VALUE: 25
PIF_VALUE: 1
PIF_VALUE: 17
PIF_VALUE: 34
PIF_VALUE: 24
PIF_VALUE: 29
PIF_VALUE: 25
PIF_VALUE: 24
PIF_VALUE: 2
PIF_VALUE: 1
PIF_VALUE: 25
PIF_VALUE: 21
PIF_VALUE: 24
PIF_VALUE: 25
PIF_VALUE: 25
PIF_VALUE: 26
PIF_VALUE: 24
PIF_VALUE: 20
PIF_VALUE: 24
PIF_VALUE: 3
PIF_VALUE: 24
PIF_VALUE: 23
PIF_VALUE: 23
PIF_VALUE: 25
PIF_VALUE: 23
PIF_VALUE: 21
PIF_VALUE: 18
PIF_VALUE: 2
PIF_VALUE: 19
PIF_VALUE: 26
PIF_VALUE: 24
PIF_VALUE: 30
PIF_VALUE: 2
PIF_VALUE: 24
PIF_VALUE: 25
PIF_VALUE: 24
PIF_VALUE: 25
PIF_VALUE: 20

## 2019-06-26 ASSESSMENT — PAIN - FUNCTIONAL ASSESSMENT: PAIN_FUNCTIONAL_ASSESSMENT: 0-10

## 2019-06-26 ASSESSMENT — PAIN SCALES - GENERAL
PAINLEVEL_OUTOF10: 0
PAINLEVEL_OUTOF10: 0

## 2019-06-26 NOTE — OP NOTE
consistent with the acute injury. We could identify the track of the distal biceps insertion on the proximal radius. It could be tracked to the supinator tubercle. At this point we identified the distal biceps tendon. There was a bulbous tip. It was quite extensive measuring at least 3 to 4 cm. I debrided at least 50% of it. I then contoured down and bullet tipped the remainder. This still left knee with an 8 mm tendon after the been fiber loop whipstitched. We placed our deep Gil retractors. We carefully supinated the hand. We drilled for our pin in the tubercle. We reamed unit cortically with 8 mm reamer. We engaged the button. We tensioned the sutures and the tendon was fully engaged in the tunnel. We passed a single loop up through the tendon. This was then used as the sliding knot for back-up fixation. Due to the large tendon size and the tight fit we did not use screw fixation. We irrigated out all the reamings. We ensured full extension. Good tension on the repair. We have closed in layers. Nylon the skin. Xeroform. Soft sterile compressive dressing. Posterior splint at 70 degrees. Anesthesia reversed stable to recovery room. Complications: None noted    Implants: Arthrex distal biceps button    Postoperative plan: He will be discharged home. He will leave the splint in place until his follow-up visit. He will elevate and be encouraged to move his hand for swelling. Keep the splint clean and dry. Pain management provided. May augment with ice and nonsteroidal anti-inflammatories. Will initiate physical therapy protocol after follow-up visit.           [address] [facility] and Sports Medicine Surgery

## 2019-06-26 NOTE — BRIEF OP NOTE
Brief Postoperative Note  ______________________________________________________________    Patient: Kimberly Davis  YOB: 1955  MRN: [id number]  Date of Procedure: 6/26/2019    Pre-Op Diagnosis: RUPTURE OF LEFT DISTAL BICEPS TENDON    Post-Op Diagnosis: Same       Procedure(s):  LEFT DISTAL BICEPS REPAIR -BLOCK-    Anesthesia: Regional, General    Surgeon(s):  Kelly Baird MD    Assistant: Surgical first assist    Estimated Blood Loss (mL): less than 50     Complications: None    Specimens:   * No specimens in log *    Implants:  Implant Name Type Inv.  Item Serial No.  Lot No. LRB No. Used   SYS BICEPS DISTL REPAIR SYSTEM Fastener SYS BICEPS DISTL REPAIR SYSTEM  [address] [id number] Left 1         Drains: * No LDAs found *    Findings: Please see operative note    Kelly Baird MD  Date: 6/26/2019  Time: 2:50 PM

## 2019-06-26 NOTE — PROGRESS NOTES
Patient awake alert ready to go home. Discharged in no distress accompanied to passenger side of car with family or significant other driving car. Assessment unchanged. Patient denies pain.

## 2019-06-26 NOTE — ANESTHESIA PRE PROCEDURE
Department of Anesthesiology  Preprocedure Note       Name:  Fernando Cervantes   Age:  60 y.o.  :  1955                                          MRN:  [id number]         Date:  2019      Surgeon: Jemima Pascual):  Symone Roman MD    Procedure: LEFT DISTAL BICEPS REPAIR -BLOCK- (Left )    Medications prior to admission:   Prior to Admission medications    Medication Sig Start Date End Date Taking? Authorizing Provider   loratadine (CLARITIN) 10 MG capsule Take 10 mg by mouth daily   Yes Historical Provider, MD   B Complex-C (SUPER B COMPLEX PO) Take by mouth daily   Yes Historical Provider, MD   CALCIUM-VITAMIN D PO Take by mouth daily   Yes Historical Provider, MD   UNABLE TO FIND Take by mouth daily Reversitrol plus   Yes Historical Provider, MD   omeprazole (PRILOSEC) 40 MG capsule Take 40 mg by mouth daily. Yes Historical Provider, MD   folic acid (FOLVITE) 702 MCG tablet Take 400 mcg by mouth daily    Yes Historical Provider, MD   Chromium Picolinate 500 MCG TABS Take 1 tablet by mouth daily.      Yes Historical Provider, MD   Cinnamon 500 MG CAPS Take 2 capsules by mouth Daily    Yes Historical Provider, MD   albuterol sulfate  (90 Base) MCG/ACT inhaler Inhale 2 puffs into the lungs every 6 hours as needed for Wheezing or Shortness of Breath 2/15/18   Denice Ferguson MD       Current medications:    Current Facility-Administered Medications   Medication Dose Route Frequency Provider Last Rate Last Dose    ceFAZolin (ANCEF) 2 g in sterile water 20 mL IV syringe  2 g Intravenous Once Symone Roman MD        lactated ringers infusion   Intravenous Continuous JOSE Bui  mL/hr at 19 1110      sodium chloride flush 0.9 % injection 10 mL  10 mL Intravenous 2 times per day JOSE Bui MD        sodium chloride flush 0.9 % injection 10 mL  10 mL Intravenous PRN JOSE Bui MD        lidocaine PF 1 % injection 0.3 mL  0.3 mL Intradermal Once PRN C Jamee Gusman MD           Allergies:  No Known Allergies    Problem List:    Patient Active Problem List   Diagnosis Code    Arevalo's esophagus K22.70    Prostate cancer ([facility] [address] C61    GERD (gastroesophageal reflux disease) K21.9    Shoulder impingement M75.40    Biceps muscle tear S46.219A    Rotator cuff tear M75.100    Mild intermittent asthma without complication X73.77    Allergic rhinitis due to animal hair and dander J30.81    IFG (impaired fasting glucose) R73.01       Past Medical History:        Diagnosis Date    Arevalo's esophagus ,     EGD 9/10 Dr. Bonny Small GERD (gastroesophageal reflux disease)     Hiatal hernia     Hyperlipidemia     past hx    Kidney stone     Prostate cancer [facility])     Urology group Dr. Bradford Hoffmann, s/p radioactive seed implants. also following with oncology. Past Surgical History:        Procedure Laterality Date    COLONOSCOPY  9/10    KIDNEY STONE SURGERY      PROSTATE SURGERY  approx     prostate biopsy, radiation seeds    SHOULDER ARTHROSCOPY Right 12-2-15    RT SHOULDER DVA; SAD; ROTATOR CUFF REPAIR; BICEPS TENODESIS     UPPER GASTROINTESTINAL ENDOSCOPY  ,     Dr. Marjorie Castellon.   repeat 2017       Social History:    Social History     Tobacco Use    Smoking status: Former Smoker     Packs/day: 1.00     Years: 20.00     Pack years: 20.00     Types: Cigarettes     Last attempt to quit: 1990     Years since quittin.5    Smokeless tobacco: Never Used   Substance Use Topics    Alcohol use: Yes     Comment: 7-14 drinks per week                                Counseling given: Not Answered      Vital Signs (Current):   Vitals:    19 1529 19 1050   BP:  128/64   Pulse:  67   Resp:  16   Temp:  97.5 °F (36.4 °C)   TempSrc:  Temporal   Weight: 225 lb (102.1 kg) 242 lb (109.8 kg)   Height: 5' 9\" (1.753 m) 5' 9\" (1.753 m)                                              BP Readings from Last 3 Encounters:   19 128/64 06/21/19 106/64   06/11/19 (!) 153/87       NPO Status: Time of last liquid consumption: 2300                        Time of last solid consumption: 2000                        Date of last liquid consumption: 06/25/19                        Date of last solid food consumption: 06/25/19    BMI:   Wt Readings from Last 3 Encounters:   06/26/19 242 lb (109.8 kg)   06/21/19 242 lb 12.8 oz (110.1 kg)   06/18/19 220 lb (99.8 kg)     Body mass index is 35.74 kg/m². CBC:   Lab Results   Component Value Date    WBC 10.2 06/21/2019    RBC 4.81 06/21/2019    HGB 16.2 06/21/2019    HCT 46.7 06/21/2019    MCV 97.0 06/21/2019    RDW 13.0 06/21/2019     06/21/2019       CMP:   Lab Results   Component Value Date     06/21/2019    K 4.4 06/21/2019     06/21/2019    CO2 25 06/21/2019    BUN 19 06/21/2019    CREATININE 1.0 06/21/2019    GFRAA >60 06/21/2019    GFRAA >60 10/15/2012    AGRATIO 1.7 12/03/2018    LABGLOM >60 06/21/2019    GLUCOSE 143 06/21/2019    PROT 6.8 12/03/2018    PROT 6.5 10/15/2012    CALCIUM 9.8 06/21/2019    BILITOT <0.2 12/03/2018    ALKPHOS 80 12/03/2018    AST 15 12/03/2018    ALT 20 12/03/2018       POC Tests: No results for input(s): POCGLU, POCNA, POCK, POCCL, POCBUN, POCHEMO, POCHCT in the last 72 hours.     Coags: No results found for: PROTIME, INR, APTT    HCG (If Applicable): No results found for: PREGTESTUR, PREGSERUM, HCG, HCGQUANT     ABGs: No results found for: PHART, PO2ART, HNJ6UHP, IOY2ILH, BEART, J0ORJWDS     Type & Screen (If Applicable):  No results found for: LABABO, [address]    Anesthesia Evaluation  Patient summary reviewed and Nursing notes reviewed  Airway: Mallampati: III  TM distance: <3 FB   Neck ROM: full  Mouth opening: > = 3 FB Dental: normal exam         Pulmonary:normal exam  breath sounds clear to auscultation  (+) asthma: exercise-induced asthma,                            Cardiovascular:    (+) hyperlipidemia        Rhythm: regular  Rate: normal Neuro/Psych:   (+) neuromuscular disease:,             GI/Hepatic/Renal:   (+) GERD: well controlled, morbid obesity          Endo/Other:    (+) malignancy/cancer. Abdominal:   (+) obese,         Vascular:                                        Anesthesia Plan      general and regional     ASA 3       Induction: intravenous. MIPS: Postoperative opioids intended and Prophylactic antiemetics administered. Anesthetic plan and risks discussed with patient. Plan discussed with CRNA.                   Suyapa Auguste MD   6/26/2019

## 2019-06-27 NOTE — ANESTHESIA POSTPROCEDURE EVALUATION
Department of Anesthesiology  Postprocedure Note    Patient: Manasa Washington  MRN: [id number]  [place]: 1955  Date of evaluation: 6/27/2019  Time:  8:31 AM     Procedure Summary     Date:  06/26/19 Room / Location:  [facility] OR [address] [place] [address] OR    Anesthesia Start:  9639 Anesthesia Stop:  5288    Procedure:  LEFT DISTAL BICEPS REPAIR -BLOCK- (Left ) Diagnosis:       Rupture of distal biceps tendon, left, initial encounter      (RUPTURE OF LEFT DISTAL BICEPS TENDON)    Surgeon:  Cody Kim MD Responsible Provider:  Vanessa Plunkett MD    Anesthesia Type:  general, regional ASA Status:  3          Anesthesia Type: general, regional    Angel Phase I: Angel Score: 8    Angel Phase II: Angel Score: 10    Last vitals: Reviewed and per EMR flowsheets.        Anesthesia Post Evaluation    Patient location during evaluation: PACU  Patient participation: complete - patient participated  Level of consciousness: awake and alert  Pain score: 0  Airway patency: patent  Nausea & Vomiting: no nausea and no vomiting  Complications: no  Cardiovascular status: blood pressure returned to baseline  Respiratory status: acceptable  Hydration status: stable

## 2019-07-15 ENCOUNTER — OFFICE VISIT (OUTPATIENT)
Dept: ORTHOPEDIC SURGERY | Age: 64
End: 2019-07-15
Payer: COMMERCIAL

## 2019-07-15 VITALS — HEIGHT: 69 IN | BODY MASS INDEX: 35.85 KG/M2 | WEIGHT: 242.06 LBS

## 2019-07-15 DIAGNOSIS — S46.212S RUPTURE OF DISTAL BICEPS TENDON, LEFT, SEQUELA: Primary | ICD-10-CM

## 2019-07-15 PROCEDURE — 99024 POSTOP FOLLOW-UP VISIT: CPT | Performed by: ORTHOPAEDIC SURGERY

## 2019-07-15 PROCEDURE — L3760 EO ADJ JT PREFAB CUSTOM FIT: HCPCS | Performed by: ORTHOPAEDIC SURGERY

## 2019-07-15 NOTE — PROGRESS NOTES
Surgery: Distal biceps repair    Post-Op Week: 2    Chief Complaint:  Post-Op Check (s/p left distal bicep repair 6/26/19)      History of Present of Illness: Doing reasonably well. He has had some vague intermittent pains radiating up and down the arm. No numbness and tingling. He is feeling a bit lightheaded today in the office with his dressings were removed. Review of Systems  Pertinent items are noted in HPI  Denies fever, chills, confusion, bowel/bladder active change. Review of systems reviewed from Patient History Form dated on July 15, 2019 and available in the patient's chart under the Media tab. Examination:  Incision is healing nicely. He has normal sensation in the lateral antebrachial cutaneous nerve. He has normal motor and sensory function of the radial nerve. He tolerates very gentle movement from 45 to 100 degrees. Roughly 90 degrees of pronation and 45 degrees of supination. Radiology:     None    Orders Placed This Encounter   Procedures    [facility] - [facility]     Referral Priority:   Routine     Referral Type:   Eval and Treat     Referral Reason:   Specialty Services Required     Requested Specialty:   Occupational Therapy     Number of Visits Requested:   1    T-Scope Elbow Brace     Patient was prescribed a Breg Elbow T-Scope Brace. The left elbow will require stabilization / immobilization from this semi-rigid / rigid orthosis to improve their function. The orthosis will assist in protecting the affected area, provide functional support and facilitate healing. The prefabricated orthosis was modified in the following manner to provide a customizable fit for the patient at the time of delivery. 1.  Identification of appropriate positioning and alignment of anatomical landmarks. 2.  Trimming of straps and adjustment of frame to fit patient. 3.  Polycentric hinge adjustments in flexion and extension.     The patient was educated and fit by a

## 2019-07-17 ENCOUNTER — HOSPITAL ENCOUNTER (OUTPATIENT)
Dept: OCCUPATIONAL THERAPY | Age: 64
Setting detail: THERAPIES SERIES
Discharge: HOME | End: 2019-07-17
Payer: COMMERCIAL

## 2019-07-17 DIAGNOSIS — S46.212S RUPTURE OF DISTAL BICEPS TENDON, LEFT, SEQUELA: Primary | ICD-10-CM

## 2019-07-17 PROCEDURE — 97110 THERAPEUTIC EXERCISES: CPT | Performed by: OCCUPATIONAL THERAPIST

## 2019-07-17 PROCEDURE — 97165 OT EVAL LOW COMPLEX 30 MIN: CPT | Performed by: OCCUPATIONAL THERAPIST

## 2019-07-17 RX ORDER — OXYCODONE HYDROCHLORIDE AND ACETAMINOPHEN 5; 325 MG/1; MG/1
1 TABLET ORAL EVERY 6 HOURS PRN
Qty: 28 TABLET | Refills: 0 | Status: SHIPPED | OUTPATIENT
Start: 2019-07-17 | End: 2019-07-24

## 2019-07-17 RX ORDER — METHOCARBAMOL 500 MG/1
500 TABLET, FILM COATED ORAL 4 TIMES DAILY
Qty: 40 TABLET | Refills: 0 | Status: SHIPPED | OUTPATIENT
Start: 2019-07-17 | End: 2019-07-27

## 2019-07-17 NOTE — FLOWSHEET NOTE
KTTWY(40526)  [] NMR (15440) x     [] Estim (attended) (11141)   [] Manual ([id number]) x     [] US (87415)  [] TA () x     [] Paraffin (04677)  [] ADL  (45 [phone] [id number]) x    [] Splint/L code:    [] Estim (unattended) (41176)  [] Other:    GOALS: Long Term Goals to be achieved in 8  weeks, including patient directed goals to address identified performance deficits:  1) Pt to be independent in graded HEP progression with a good level of effort and compliance. 2) Pt to report a score of 70 % or less on the Quick DASH disability questionnaire for increased performance with carrying, moving, and handling objects. 3) Pt will demonstrate increased ROM of elbow by 20 total degrees, wrist by 30 degrees for improved performance of bathing, reaching, home tasks  4) Pt will demonstrate an increase in gross grasp strength to 75% of uninvolved hand for improvement of heavier work tasks, lifting freight  5) Pt will have a decrease in pain to 5/10 with use to facilitate improvement in strength, movement, function, and ADLs. 6) Pt stated goals: normal strength and movement    Progression Towards Functional goals:  [] Patient is progressing as expected towards functional goals listed. [] Progression is slowed due to complexities listed. [] Progression has been slowed due to co-morbidities.   [x] Plan just implemented, too soon to assess goals progression  [] Other:     ASSESSMENT:  See eval    Treatment/Activity Tolerance:  [] Patient tolerated treatment well [] Patient limited by fatigue  [] Patient limited by pain  [] Patient limited by other medical complications  [] Other:     Prognosis: [x] Good [] Fair  [] Poor    Patient Requires Follow-up: [x] Yes  [] No    PLAN: See eval  [] Continue per plan of care [] Alter current plan (see comments)  [x] Plan of care initiated [] Hold pending MD visit [] Discharge    Electronically signed by: Lang Mcdonald OTR/L, [facility]

## 2019-07-17 NOTE — PLAN OF CARE
heat/cold pack, electrical stimulation, contrast bath, iontophoresis  [x] Splinting    Frequency/Duration:  1-2 days per week for 6-8 weeks      GOALS:  Short Term Goals: To be achieved in: 2 weeks  1. Independent in HEP and progression per patient tolerance, in order to prevent re-injury. 2. Patient will have a decrease in pain to facilitate improvement in movement, function, and ADLs as indicated by Functional Deficits. Long Term Goals to be achieved in 8  weeks, including patient directed goals to address identified performance deficits:  1) Pt to be independent in graded HEP progression with a good level of effort and compliance. 2) Pt to report a score of 70 % or less on the Quick DASH disability questionnaire for increased performance with carrying, moving, and handling objects. 3) Pt will demonstrate increased ROM of elbow by 20 total degrees, wrist by 30 degrees for improved performance of bathing, reaching, home tasks  4) Pt will demonstrate an increase in gross grasp strength to 75% of uninvolved hand for improvement of heavier work tasks, lifting freight  5) Pt will have a decrease in pain to 5/10 with use to facilitate improvement in strength, movement, function, and ADLs.   6) Pt stated goals: normal strength and movement      OCCUPATIONAL THERAPY EVALUATION COMPLEXITY JUSTIFICATION:    [x] An occupational profile and medical/therapy history, which includes:   [x] a brief history including medical and/or therapy records relating to the     presenting problem   [] an expanded review of medical and/or therapy records and additional review     of physical, cognitive or psychosocial history related to current functional    performance   [] an extensive additional review of review of medical and/or therapy records   and physical, cognitive, or psychosocial history related to current    functional performance    [x] An assessment that identifies performance deficits (relating to physical, cognitive, or

## 2019-07-24 ENCOUNTER — HOSPITAL ENCOUNTER (OUTPATIENT)
Dept: OCCUPATIONAL THERAPY | Age: 64
Setting detail: THERAPIES SERIES
Discharge: HOME | End: 2019-07-24
Payer: COMMERCIAL

## 2019-07-24 PROCEDURE — 97140 MANUAL THERAPY 1/> REGIONS: CPT | Performed by: OCCUPATIONAL THERAPIST

## 2019-07-24 PROCEDURE — 97110 THERAPEUTIC EXERCISES: CPT | Performed by: OCCUPATIONAL THERAPIST

## 2019-07-24 RX ORDER — GABAPENTIN 300 MG/1
300 CAPSULE ORAL 3 TIMES DAILY
Qty: 90 CAPSULE | Refills: 1 | Status: SHIPPED | OUTPATIENT
Start: 2019-07-24 | End: 2022-01-27 | Stop reason: ALTCHOICE

## 2019-07-24 RX ORDER — METHYLPREDNISOLONE 4 MG/1
TABLET ORAL
Qty: 1 KIT | Refills: 0 | Status: SHIPPED | OUTPATIENT
Start: 2019-07-24 | End: 2019-10-14 | Stop reason: ALTCHOICE

## 2019-08-08 ENCOUNTER — HOSPITAL ENCOUNTER (OUTPATIENT)
Dept: OCCUPATIONAL THERAPY | Age: 64
Setting detail: THERAPIES SERIES
Discharge: HOME | End: 2019-08-08
Payer: COMMERCIAL

## 2019-08-08 PROCEDURE — 97110 THERAPEUTIC EXERCISES: CPT | Performed by: OCCUPATIONAL THERAPIST

## 2019-08-08 PROCEDURE — 97140 MANUAL THERAPY 1/> REGIONS: CPT | Performed by: OCCUPATIONAL THERAPIST

## 2019-08-08 NOTE — FLOWSHEET NOTE
PROM    Splinting:  [] Fabrication of:   [] (51064) Checkout for orthotic/prosthetic use, established patient   [] (34798) Orthotic management and training (fitting and assessment)  [] Comments:    Therapy start time: 9:00  Therapy end time: 9:51      Charges:  Timed Code Treatment Minutes: 41   Total Treatment Minutes: 51     [] EVAL (LOW) 46920   [] OT Re-eval (87460)  [] EVAL (MOD) 39843   [] EVAL (HIGH) 94781       [x] Cande (63577) x  2  [] PZVUQ(42596)  [] NMR (86814) x     [] Estim (attended) (97406)   [x] Manual ([id number]) x 1    [] US (77724)  [] TA (84162) x     [] Paraffin (90345)  [] ADL  (34969) x    [] Splint/L code:    [] Estim (unattended) (73391)  [] Other:    GOALS: Long Term Goals to be achieved in 8  weeks, including patient directed goals to address identified performance deficits:  1) Pt to be independent in graded HEP progression with a good level of effort and compliance. 2) Pt to report a score of 70 % or less on the Quick DASH disability questionnaire for increased performance with carrying, moving, and handling objects. 3) Pt will demonstrate increased ROM of elbow by 20 total degrees, wrist by 30 degrees for improved performance of bathing, reaching, home tasks  4) Pt will demonstrate an increase in gross grasp strength to 75% of uninvolved hand for improvement of heavier work tasks, lifting freight  5) Pt will have a decrease in pain to 5/10 with use to facilitate improvement in strength, movement, function, and ADLs. 6) Pt stated goals: normal strength and movement      Progression Towards Functional goals:  [] Patient is progressing as expected towards functional goals listed. [x] Progression is slowed due to complexities listed. [] Progression has been slowed due to co-morbidities. [] Plan just implemented, too soon to assess goals progression  [] Other:     ASSESSMENT:  Good overall ROM, but continues to voice pain complaints through whole arm, seemingly unrelated to surgery.  Also

## 2019-08-12 ENCOUNTER — OFFICE VISIT (OUTPATIENT)
Dept: ORTHOPEDIC SURGERY | Age: 64
End: 2019-08-12

## 2019-08-12 ENCOUNTER — HOSPITAL ENCOUNTER (OUTPATIENT)
Dept: OCCUPATIONAL THERAPY | Age: 64
Setting detail: THERAPIES SERIES
Discharge: HOME | End: 2019-08-12
Payer: COMMERCIAL

## 2019-08-12 VITALS — HEIGHT: 69 IN | BODY MASS INDEX: 35.85 KG/M2 | WEIGHT: 242.06 LBS

## 2019-08-12 DIAGNOSIS — S46.212S RUPTURE OF DISTAL BICEPS TENDON, LEFT, SEQUELA: Primary | ICD-10-CM

## 2019-08-12 PROCEDURE — 99024 POSTOP FOLLOW-UP VISIT: CPT | Performed by: ORTHOPAEDIC SURGERY

## 2019-08-12 PROCEDURE — 97140 MANUAL THERAPY 1/> REGIONS: CPT | Performed by: OCCUPATIONAL THERAPIST

## 2019-08-12 PROCEDURE — 97110 THERAPEUTIC EXERCISES: CPT | Performed by: OCCUPATIONAL THERAPIST

## 2019-08-12 NOTE — FLOWSHEET NOTE
[facility], [address] [place]  Phone: [phone]  Fax [phone]      Hand Therapy Daily Treatment Note  Date:  2019    Patient: Adelfo Sanchez   : 1955   MRN: [id number]  Referring Physician: Referring Practitioner: Dr. Oneyda Jewell Diagnosis Information:  Diagnosis: s/p L distal biceps repair (C18.931W)                                          Assessment: M25.522                                    Insurance information: OT Insurance Information: [facility]    Date of Surgery: 19      Visit # Insurance Allowable   4 12     Date of Patient follow up with Physician: 19     Functional Disability Rating: Quick DASH - 100%      Progress Note: []  Yes  [x]  No  Next due by: Visit #10      Latex Allergy:  [x]NO      []YES    Preferred Language for Healthcare:   [x]English       []other:    Pain level:  0/10 resting, 1-5/54 certain motion     SUBJECTIVE:  Patient transfer from [facility]. 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 Saw MD today, patient does not have restrictions for therapy, just to follow protocol, which is progressive strengthening at this point. And he does not need to use the brace. Has olecranon bursitis on left elbow, which he reported has been getting smaller, hurts to rest on his elbow. Gave him a donut pad made of blue memory foam inside stockinet to cushion olecranon. RESTRICTIONS/PRECAUTIONS: progress to strengthening.  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 - not met  6) Pt stated goals: normal strength and movement -not met     Progression Towards Functional goals:  [x] Patient is progressing as expected towards functional goals listed. [] Progression is slowed due to complexities listed. [] Progression has been slowed due to co-morbidities. [] Plan just implemented, too soon to assess goals progression  [] Other:     ASSESSMENT:  ROM in elbow and forearm WNL. Pt has pain complaints in arm, voices willingness to push through so he can return to work. Progressive strengthening in therapy, may benefit from eventual work conditioning prior to return to work. Treatment/Activity Tolerance:  [] Patient tolerated treatment well [] Patient limited by fatigue  [x] Patient limited by pain  [] Patient limited by other medical complications  [] Other:     Prognosis: [x] Good [] Fair  [] Poor    Patient Requires Follow-up: [x] Yes  [] No    PLAN:   [x] Continue per plan of care- Increase frequency to 2x/week, progressive strengthening in therapy, may benefit from eventual work conditioning prior to return to work.    [x] Alter current plan (see comments)-as above 8/12/19  [] Plan of care initiated [] Hold pending MD visit [] Discharge    Electronically signed by: NANCY Courtney/CELESTINE, [address] [facility]

## 2019-08-16 ENCOUNTER — HOSPITAL ENCOUNTER (OUTPATIENT)
Dept: OCCUPATIONAL THERAPY | Age: 64
Setting detail: THERAPIES SERIES
Discharge: HOME | End: 2019-08-16
Payer: COMMERCIAL

## 2019-08-16 PROCEDURE — 97112 NEUROMUSCULAR REEDUCATION: CPT | Performed by: OCCUPATIONAL THERAPIST

## 2019-08-16 PROCEDURE — 97110 THERAPEUTIC EXERCISES: CPT | Performed by: OCCUPATIONAL THERAPIST

## 2019-08-16 PROCEDURE — 97140 MANUAL THERAPY 1/> REGIONS: CPT | Performed by: OCCUPATIONAL THERAPIST

## 2019-08-16 NOTE — FLOWSHEET NOTE
[place] [facility] [facility], [address] [facility] [facility] [place]  Phone: [phone]  Fax [phone]      Hand Therapy Daily Treatment Note  Date:  2019    Patient: Jessie Valdes   : 1955   MRN: [id number]  Referring Physician: Referring Practitioner: Dr. Chavo Zhou Diagnosis Information:  Diagnosis: s/p L distal biceps repair (B88.527B)                                          Assessment: M25.522                                    Insurance information: OT Insurance Information: [facility]    Date of Surgery: 19      Visit # Insurance Allowable        Date of Patient follow up with Physician: 19     Functional Disability Rating: Quick DASH - 100%      Progress Note: []  Yes  [x]  No  Next due by: Visit #10      Latex Allergy:  [x]NO      []YES    Preferred Language for Healthcare:   [x]English       []other:    Pain level:  0/10 resting, 2-4/10 with HEP     SUBJECTIVE:  Pt reports compliance with HEP however c/o soreness with exercises involving triceps. Pt very eager to address strengthening however OT needed to caution Pt not to over do    Patient transfer from [facility]. 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 Saw MD today, patient does not have restrictions for therapy, just to follow protocol, which is progressive strengthening at this point. And he does not need to use the brace. Has olecranon bursitis on left elbow, which he reported has been getting smaller, hurts to rest on his elbow. Gave him a donut pad made of blue memory foam inside stockinet to cushion olecranon. RESTRICTIONS/PRECAUTIONS: progress to strengthening.  19     OBJECTIVE:          Date:  19    Objective Measures:         Elbow AROM 15/115 10/130 0/130 WNL     Wrist AROM 50/40 55/45 55/55      Sup/pron 45/40 45/50 45/50 WNL score of 70 % or less on the Quick DASH disability questionnaire for increased performance with carrying, moving, and handling objects.-not reassessed  3) Pt will demonstrate increased ROM of elbow by 20 total degrees, wrist by 30 degrees for improved performance of bathing, reaching, home tasks-met  4) Pt will demonstrate an increase in gross grasp strength to 75% of uninvolved hand for improvement of heavier work tasks, 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 - not met  6) Pt stated goals: normal strength and movement -not met     Progression Towards Functional goals:  [x] Patient is progressing as expected towards functional goals listed. [] Progression is slowed due to complexities listed. [] Progression has been slowed due to co-morbidities. [] Plan just implemented, too soon to assess goals progression  [] Other:     ASSESSMENT:  ROM in elbow and forearm WNL. Pt has pain complaints in arm, voices willingness to push through so he can return to work. Progressive strengthening in therapy, may benefit from eventual work conditioning prior to return to work. Treatment/Activity Tolerance:  [] Patient tolerated treatment well [] Patient limited by fatigue  [x] Patient limited by pain  [] Patient limited by other medical complications  [] Other:     Prognosis: [x] Good [] Fair  [] Poor    Patient Requires Follow-up: [x] Yes  [] No    PLAN:   [x] Continue per plan of care- Increase frequency to 2x/week, progressive strengthening in therapy, may benefit from eventual work conditioning prior to return to work.    [x] Alter current plan (see comments)-as above 8/12/19  [] Plan of care initiated [] Hold pending MD visit [] Discharge    Electronically signed by: Quentin Stephen OT/L [id number]

## 2019-08-20 ENCOUNTER — HOSPITAL ENCOUNTER (OUTPATIENT)
Dept: OCCUPATIONAL THERAPY | Age: 64
Setting detail: THERAPIES SERIES
Discharge: HOME | End: 2019-08-20
Payer: COMMERCIAL

## 2019-08-20 PROCEDURE — 97112 NEUROMUSCULAR REEDUCATION: CPT | Performed by: OCCUPATIONAL THERAPIST

## 2019-08-20 PROCEDURE — 97110 THERAPEUTIC EXERCISES: CPT | Performed by: OCCUPATIONAL THERAPIST

## 2019-08-20 PROCEDURE — 97140 MANUAL THERAPY 1/> REGIONS: CPT | Performed by: OCCUPATIONAL THERAPIST

## 2019-08-20 PROCEDURE — G0283 ELEC STIM OTHER THAN WOUND: HCPCS | Performed by: OCCUPATIONAL THERAPIST

## 2019-08-23 ENCOUNTER — HOSPITAL ENCOUNTER (OUTPATIENT)
Dept: OCCUPATIONAL THERAPY | Age: 64
Setting detail: THERAPIES SERIES
Discharge: HOME | End: 2019-08-23
Payer: COMMERCIAL

## 2019-08-23 PROCEDURE — 97110 THERAPEUTIC EXERCISES: CPT | Performed by: OCCUPATIONAL THERAPIST

## 2019-08-23 PROCEDURE — G0283 ELEC STIM OTHER THAN WOUND: HCPCS | Performed by: OCCUPATIONAL THERAPIST

## 2019-08-23 PROCEDURE — 97140 MANUAL THERAPY 1/> REGIONS: CPT | Performed by: OCCUPATIONAL THERAPIST

## 2019-08-23 PROCEDURE — 97112 NEUROMUSCULAR REEDUCATION: CPT | Performed by: OCCUPATIONAL THERAPIST

## 2019-08-23 NOTE — FLOWSHEET NOTE
[facility], [address] [place]  Phone: [phone]  Fax [phone]      Hand Therapy Daily Treatment Note  Date:  2019    Patient: Sena Norman   : 1955   MRN: [id number]  Referring Physician: Referring Practitioner: Dr. Paddy Trujillo Diagnosis Information:  Diagnosis: s/p L distal biceps repair (F81.895Y)                                          Assessment: M25.522                                    Insurance information: OT Insurance Information: [facility]    Date of Surgery: 19      Visit # Insurance Allowable   7      Date of Patient follow up with Physician: 19     Functional Disability Rating: Quick DASH - 100%      Progress Note: []  Yes  [x]  No  Next due by: Visit #10      Latex Allergy:  [x]NO      []YES    Preferred Language for Healthcare:   [x]English       []other:    Pain level:  2/10 during the day increased pain at night    SUBJECTIVE:  Pt reports pain is much better however pain is still interfering with sleep. Patient transfer from [facility]. 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 Saw MD today, patient does not have restrictions for therapy, just to follow protocol, which is progressive strengthening at this point. And he does not need to use the brace. Has olecranon bursitis on left elbow, which he reported has been getting smaller, hurts to rest on his elbow. Gave him a donut pad made of blue memory foam inside stockinet to cushion olecranon. RESTRICTIONS/PRECAUTIONS: progress to strengthening.  19     OBJECTIVE:          Date:  19   Objective Measures:          Elbow AROM 15/115 10/130 0/130 WNL      Wrist AROM 50/40 55/45 55/55       Sup/pron 45/40 45/50 45/50 WNL      MMT           strength     assess     Modalities:

## 2019-08-27 ENCOUNTER — HOSPITAL ENCOUNTER (OUTPATIENT)
Dept: OCCUPATIONAL THERAPY | Age: 64
Setting detail: THERAPIES SERIES
Discharge: HOME | End: 2019-08-27
Payer: COMMERCIAL

## 2019-08-27 PROCEDURE — 97035 APP MDLTY 1+ULTRASOUND EA 15: CPT | Performed by: OCCUPATIONAL THERAPIST

## 2019-08-27 PROCEDURE — 97140 MANUAL THERAPY 1/> REGIONS: CPT | Performed by: OCCUPATIONAL THERAPIST

## 2019-08-27 PROCEDURE — 97112 NEUROMUSCULAR REEDUCATION: CPT | Performed by: OCCUPATIONAL THERAPIST

## 2019-08-27 PROCEDURE — 97110 THERAPEUTIC EXERCISES: CPT | Performed by: OCCUPATIONAL THERAPIST

## 2019-08-27 NOTE — FLOWSHEET NOTE
limited by fatigue  [x] Patient limited by pain  [] Patient limited by other medical complications  [] Other:     Prognosis: [x] Good [] Fair  [] Poor    Patient Requires Follow-up: [x] Yes  [] No    PLAN:   [x] Continue per plan of care- Increase frequency to 2x/week, progressive strengthening in therapy, may benefit from eventual work conditioning prior to return to work.    [x] Alter current plan (see comments)-as above 8/12/19  [] Plan of care initiated [] Hold pending MD visit [] Discharge    Electronically signed by: Francisca Marcelo OT/L [id number]

## 2019-08-30 ENCOUNTER — HOSPITAL ENCOUNTER (OUTPATIENT)
Dept: OCCUPATIONAL THERAPY | Age: 64
Setting detail: THERAPIES SERIES
Discharge: HOME | End: 2019-08-30
Payer: COMMERCIAL

## 2019-08-30 PROCEDURE — 97110 THERAPEUTIC EXERCISES: CPT | Performed by: OCCUPATIONAL THERAPIST

## 2019-08-30 PROCEDURE — 97140 MANUAL THERAPY 1/> REGIONS: CPT | Performed by: OCCUPATIONAL THERAPIST

## 2019-08-30 PROCEDURE — G0283 ELEC STIM OTHER THAN WOUND: HCPCS | Performed by: OCCUPATIONAL THERAPIST

## 2019-08-30 NOTE — FLOWSHEET NOTE
AROM 50/40 55/45 55/55         Sup/pron 45/40 45/50 45/50 WNL        MMT             strength     assess       Modalities:            MHP   10' 10  INF + HP 15' INF + HP 15' HP 10' INF + HP 15'   US        50% @ 1.7 8' radial side of wrist and forearm                                        Therapeutic Exercise, Activities, NMR:            HEP/pt education 25' review review [place] Theraband for wrist, elbow, scap; s/p w 2 wts; chair press for triceps and wt bearing, wall push up- HEP-copy in chart  Radial nerve glide added to HEP    Revised HEP to reduce wrist pain      Rotation wheel  5' 6'  Power  with sponge . #2 (Pt was unable to complete due to pain)  Finisher 2# 8' Finisher 2# 8' Finisher 3#    putty     Yellow  Rolling, kneading Yellow  Rolling, kneading Yellow  Rolling, kneading Yellow  Rolling, kneading           Power web  airdyne 5' with LEs   Manual therapy    DTM forearm extensor surface, volar elbow- is hypersensitive, but did tolerate, many areas of muscle tightness and soreness DTM forearm extensor surface, volar elbow- is hypersensitive, but did tolerate, many areas of muscle tightness and soreness.  Pt had difficulty tolerating SASTM DTM forearm extensor surface, volar elbow- is hypersensitive, but did tolerate, many areas of muscle tightness and soreness DTM forearm extensor surface, volar elbow- is hypersensitive, but did tolerate, many areas of muscle tightness and soreness DTM forearm extensor surface, volar elbow- is hypersensitive, but did tolerate, many areas of muscle tightness and soreness      Therapeutic Exercise and NMR:  [x] (47180) Provided verbal/tactile cueing for activities related to strengthening, flexibility, endurance, ROM  for improvements in scapular, scapulothoracic and UE control with self care, reaching, carrying, lifting, house/yardwork, driving/computer work.    [] (18366) Provided verbal/tactile cueing for activities related to improving balance, coordination, kinesthetic sense, posture, motor skill, proprioception  to assist with  scapular, scapulothoracic and UE control with self care, reaching, carrying, lifting, house/yardwork, driving/computer work. [] Comments:    Therapeutic Activities:    [] (24103 or 28740) Provided verbal/tactile cueing for activities related to improving balance, coordination, kinesthetic sense, posture, motor skill, proprioception and motor activation to allow for proper function of scapular, scapulothoracic and UE control with self care, carrying, lifting, driving/computer work  [] Comments:  To remove brace at home now for light ADLs, activities    Home Exercise Program:    [] (36574) Reviewed/Progressed HEP activities related to strengthening, flexibility, endurance, ROM of scapular, scapulothoracic and UE control with self care, reaching, carrying, lifting, house/yardwork, driving/computer work  [] (20691) Reviewed/Progressed HEP activities related to improving balance, coordination, kinesthetic sense, posture, motor skill, proprioception of scapular, scapulothoracic and UE control with self care, reaching, carrying, lifting, house/yardwork, driving/computer work    [x] Comments: 8/12/19 as above and copy in chart-     Manual Treatments: Andi Mondragon / AMBER / Kiran-Mobs:  G-I, II, III, IV (PA's, Inf., Post.)  [x] (39743) Provided manual therapy to mobilize soft tissue/joints of cervical/CT, scapular GHJ and UE for the purpose of modulating pain, promoting relaxation,  increasing ROM, reducing/eliminating soft tissue swelling/inflammation/restriction, improving soft tissue extensibility and allowing for proper ROM for normal function with self care, reaching, carrying, lifting, house/yardwork, driving/computer work  [x] Comments: mobs volar elbow, forearm extensors     Splinting:  [] Fabrication of:   [] (57870) Checkout for orthotic/prosthetic use, established patient   [] (06419) Orthotic management and training (fitting and

## 2019-09-03 ENCOUNTER — HOSPITAL ENCOUNTER (OUTPATIENT)
Dept: OCCUPATIONAL THERAPY | Age: 64
Setting detail: THERAPIES SERIES
Discharge: HOME | End: 2019-09-03
Payer: COMMERCIAL

## 2019-09-03 PROCEDURE — 97110 THERAPEUTIC EXERCISES: CPT | Performed by: OCCUPATIONAL THERAPIST

## 2019-09-03 PROCEDURE — 97140 MANUAL THERAPY 1/> REGIONS: CPT | Performed by: OCCUPATIONAL THERAPIST

## 2019-09-03 PROCEDURE — 97112 NEUROMUSCULAR REEDUCATION: CPT | Performed by: OCCUPATIONAL THERAPIST

## 2019-09-03 PROCEDURE — 97035 APP MDLTY 1+ULTRASOUND EA 15: CPT | Performed by: OCCUPATIONAL THERAPIST

## 2019-09-06 ENCOUNTER — HOSPITAL ENCOUNTER (OUTPATIENT)
Dept: OCCUPATIONAL THERAPY | Age: 64
Setting detail: THERAPIES SERIES
Discharge: HOME | End: 2019-09-06
Payer: COMMERCIAL

## 2019-09-06 PROCEDURE — 97110 THERAPEUTIC EXERCISES: CPT | Performed by: OCCUPATIONAL THERAPIST

## 2019-09-06 PROCEDURE — 97035 APP MDLTY 1+ULTRASOUND EA 15: CPT | Performed by: OCCUPATIONAL THERAPIST

## 2019-09-06 PROCEDURE — 97140 MANUAL THERAPY 1/> REGIONS: CPT | Performed by: OCCUPATIONAL THERAPIST

## 2019-09-06 NOTE — FLOWSHEET NOTE
activities related to strengthening, flexibility, endurance, ROM  for improvements in scapular, scapulothoracic and UE control with self care, reaching, carrying, lifting, house/yardwork, driving/computer work.    [] (72132) Provided verbal/tactile cueing for activities related to improving balance, coordination, kinesthetic sense, posture, motor skill, proprioception  to assist with  scapular, scapulothoracic and UE control with self care, reaching, carrying, lifting, house/yardwork, driving/computer work. [] Comments:    Therapeutic Activities:    [] (53332 or 56970) Provided verbal/tactile cueing for activities related to improving balance, coordination, kinesthetic sense, posture, motor skill, proprioception and motor activation to allow for proper function of scapular, scapulothoracic and UE control with self care, carrying, lifting, driving/computer work  [] Comments:  To remove brace at home now for light ADLs, activities    Home Exercise Program:    [] (45956) Reviewed/Progressed HEP activities related to strengthening, flexibility, endurance, ROM of scapular, scapulothoracic and UE control with self care, reaching, carrying, lifting, house/yardwork, driving/computer work  [] (40425) Reviewed/Progressed HEP activities related to improving balance, coordination, kinesthetic sense, posture, motor skill, proprioception of scapular, scapulothoracic and UE control with self care, reaching, carrying, lifting, house/yardwork, driving/computer work    [x] Comments: 8/12/19 as above and copy in chart-     Manual Treatments:  Kevin Medicus / STM / Oscillations-Mobs:  G-I, II, III, IV (PA's, Inf., Post.)  [x] (61078) Provided manual therapy to mobilize soft tissue/joints of cervical/CT, scapular GHJ and UE for the purpose of modulating pain, promoting relaxation,  increasing ROM, reducing/eliminating soft tissue swelling/inflammation/restriction, improving soft tissue extensibility and allowing for proper ROM for normal Plan just implemented, too soon to assess goals progression  [] Other:     ASSESSMENT:  Progress with pain mgt and tolerance for use is very slow. Treatment/Activity Tolerance:  [] Patient tolerated treatment well [] Patient limited by fatigue  [x] Patient limited by pain  [] Patient limited by other medical complications  [] Other:     Prognosis: [x] Good [] Fair  [] Poor    Patient Requires Follow-up: [x] Yes  [] No    PLAN:   [x] Continue per plan of care- Increase frequency to 2x/week, progressive strengthening in therapy, may benefit from eventual work conditioning prior to return to work.    [x] Alter current plan (see comments)-as above 8/12/19  [] Plan of care initiated [] Hold pending MD visit [] Discharge    Electronically signed by: Chris Moreira OT/CELESTINE [id number]

## 2019-09-09 ENCOUNTER — OFFICE VISIT (OUTPATIENT)
Dept: ORTHOPEDIC SURGERY | Age: 64
End: 2019-09-09

## 2019-09-09 VITALS — BODY MASS INDEX: 35.55 KG/M2 | HEIGHT: 69 IN | WEIGHT: 240 LBS

## 2019-09-09 DIAGNOSIS — S46.212S RUPTURE OF DISTAL BICEPS TENDON, LEFT, SEQUELA: Primary | ICD-10-CM

## 2019-09-09 PROCEDURE — 99024 POSTOP FOLLOW-UP VISIT: CPT | Performed by: ORTHOPAEDIC SURGERY

## 2019-09-09 NOTE — PROGRESS NOTES
program.          [address] [facility] of [facility] ([facility])  Shoulder and Sports Medicine Surgery

## 2019-09-12 ENCOUNTER — TELEPHONE (OUTPATIENT)
Dept: ORTHOPEDIC SURGERY | Age: 64
End: 2019-09-12

## 2019-09-13 ENCOUNTER — HOSPITAL ENCOUNTER (OUTPATIENT)
Dept: OCCUPATIONAL THERAPY | Age: 64
Setting detail: THERAPIES SERIES
Discharge: HOME | End: 2019-09-13
Payer: COMMERCIAL

## 2019-09-13 PROCEDURE — 97112 NEUROMUSCULAR REEDUCATION: CPT | Performed by: OCCUPATIONAL THERAPIST

## 2019-09-13 PROCEDURE — 97110 THERAPEUTIC EXERCISES: CPT | Performed by: OCCUPATIONAL THERAPIST

## 2019-09-13 PROCEDURE — 97140 MANUAL THERAPY 1/> REGIONS: CPT | Performed by: OCCUPATIONAL THERAPIST

## 2019-09-13 PROCEDURE — 97035 APP MDLTY 1+ULTRASOUND EA 15: CPT | Performed by: OCCUPATIONAL THERAPIST

## 2019-09-13 NOTE — FLOWSHEET NOTE
[facility] [address] [facility] [place]  Phone: [phone]  Fax [phone]      Hand Therapy Daily Treatment Note  Date:  2019    Patient: Aditi Pinedo   : 1955   MRN: [id number]  Referring Physician: Referring Practitioner: Dr. Kasi Todd Diagnosis Information:  Diagnosis: s/p L distal biceps repair (D36.769C)                                          Assessment: M25.522                                    Insurance information: OT Insurance Information: [facility]    Date of Surgery: 19      Visit # Insurance Allowable   12 12     Date of Patient follow up with Physician: 19     Functional Disability Rating: Quick DASH - 100%      Progress Note: []  Yes  [x]  No  Next due by: Visit #10      Latex Allergy:  [x]NO      []YES    Preferred Language for Healthcare:   [x]English       []other:    Pain level: 1- 2/10 at rest and with light use. Increases to 2--3/10    SUBJECTIVE:  Pt reports that he thinks he over did it during the last session. He felt good during the session but had significant pain a couple hours afterward. He feels better today. Patient transfer from [facility]. 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 Saw MD today, patient does not have restrictions for therapy, just to follow protocol, which is progressive strengthening at this point. And he does not need to use the brace. Has olecranon bursitis on left elbow, which he reported has been getting smaller, hurts to rest on his elbow. Gave him a donut pad made of blue memory foam inside stockinet to cushion olecranon. RESTRICTIONS/PRECAUTIONS: progress to strengthening.  19     OBJECTIVE:          Date:  7/17/19 7/24/19 8/8/19 8/12/19  8/16/19 8/20/19 8/23/19 8/27/19 8/30/19 9/3/19 9/6/19 9/13/19   Objective Measures:          Pain 2-4/10      Elbow AROM

## 2019-09-17 ENCOUNTER — HOSPITAL ENCOUNTER (OUTPATIENT)
Dept: OCCUPATIONAL THERAPY | Age: 64
Setting detail: THERAPIES SERIES
Discharge: HOME | End: 2019-09-17
Payer: COMMERCIAL

## 2019-09-17 PROCEDURE — 97140 MANUAL THERAPY 1/> REGIONS: CPT | Performed by: OCCUPATIONAL THERAPIST

## 2019-09-17 PROCEDURE — 97110 THERAPEUTIC EXERCISES: CPT | Performed by: OCCUPATIONAL THERAPIST

## 2019-09-17 PROCEDURE — 97035 APP MDLTY 1+ULTRASOUND EA 15: CPT | Performed by: OCCUPATIONAL THERAPIST

## 2019-09-17 NOTE — FLOWSHEET NOTE
[facility], [address] [place]  Phone: [phone]  Fax [phone]      Hand Therapy Daily Treatment Note  Date:  2019    Patient: Farshad Stokes   : 1955   MRN: [id number]  Referring Physician: Referring Practitioner: Dr. Ulysses Cheng Diagnosis Information:  Diagnosis: s/p L distal biceps repair (X09.874K)                                          Assessment: M25.522                                    Insurance information: OT Insurance Information: [facility]    Date of Surgery: 19      Visit # Insurance Allowable   13 12     Date of Patient follow up with Physician: 19     Functional Disability Rating: Quick DASH - 100%      Progress Note: []  Yes  [x]  No  Next due by: Visit #10      Latex Allergy:  [x]NO      []YES    Preferred Language for Healthcare:   [x]English       []other:    Pain level: 1- 2/10 at rest and with light use. Increases to 2--3/10    SUBJECTIVE:  Pt reports that he thinks he over did it during the last session. He felt good during the session but had significant pain a couple hours afterward. He feels better today. Patient transfer from [facility] office. 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 Saw MD today, patient does not have restrictions for therapy, just to follow protocol, which is progressive strengthening at this point. And he does not need to use the brace. Has olecranon bursitis on left elbow, which he reported has been getting smaller, hurts to rest on his elbow. Gave him a donut pad made of blue memory foam inside stockinet to cushion olecranon. RESTRICTIONS/PRECAUTIONS: progress to strengthening.  19     OBJECTIVE:          Date:  7/17/19 7/24/19 8/8/19 8/12/19  8/16/19 8/20/19 8/23/19 8/27/19 8/30/19 9/3/19 9/6/19 9/13/19 9/17/19   Objective Measures:          Pain 2-4/10 Elbow AROM 15/115 10/130 0/130 WNL      WNL      Wrist AROM 50/40 55/45 55/55       60/65      Sup/pron 45/40 45/50 45/50 WNL      WNL      MMT                DASH 100%         64%       strength     R 108#  L 50#     R 99#   L 58#      Modalities:                MHP   10' 10  INF + HP 15' INF + HP 15' HP 10' INF + HP 15' HP 10' 10' 10' 10'   US        50% @ 1.7 8' radial side of wrist and forearm  8' 8' 10' 8'                                                   Therapeutic Exercise, Activities, NMR:                HEP/pt education 25' review review [place] Theraband for wrist, elbow, scap; s/p w 2 wts; chair press for triceps and wt bearing, wall push up- HEP-copy in chart  Radial nerve glide added to HEP    Revised HEP to reduce wrist pain      Discussed  stabilizing wrist while doing exercises at home to prevent pain        Power  with sponge     Rotation wheel  5' 6'  Power  with sponge . #2 (Pt was unable to complete due to pain)  Finisher 2# 8' Finisher 2# 8' Finisher 3#  Finisher 4# 3# #4 #4        Red flex bar    putty     Yellow  Rolling, kneading Yellow  Rolling, kneading Yellow  Rolling, kneading Yellow  Rolling, kneading  Yellow  Rolling, kneading Yellow  Rolling, kneading Yellow  Rolling, kneading Power  with  Sponge           Power web  airdyne 5' with LEs Airdyne 5' without LEs Pulleys 5' Airdyne 5' with LEs Airdyne 5'without LE   Manual therapy    DTM forearm extensor surface, volar elbow- is hypersensitive, but did tolerate, many areas of muscle tightness and soreness DTM forearm extensor surface, volar elbow- is hypersensitive, but did tolerate, many areas of muscle tightness and soreness.  Pt had difficulty tolerating SASTM DTM forearm extensor surface, volar elbow- is hypersensitive, but did tolerate, many areas of muscle tightness and soreness DTM forearm extensor surface, volar elbow- is hypersensitive, but did tolerate, many areas of muscle tightness and to 5/10 with use to facilitate improvement in strength, movement, function, and ADLs. - not met  6) Pt stated goals: normal strength and movement -not met     Progression Towards Functional goals:  [x] Patient is progressing as expected towards functional goals listed. [] Progression is slowed due to complexities listed. [] Progression has been slowed due to co-morbidities. [] Plan just implemented, too soon to assess goals progression  [] Other:     ASSESSMENT:  Progress with pain mgt and tolerance for use is very slow. Treatment/Activity Tolerance:  [x] Patient tolerated treatment well [] Patient limited by fatigue  [] Patient limited by pain  [] Patient limited by other medical complications  [] Other:     Prognosis: [x] Good [] Fair  [] Poor    Patient Requires Follow-up: [x] Yes  [] No    PLAN:   [x] Continue per plan of care- Increase frequency to 2x/week, progressive strengthening in therapy, may benefit from eventual work conditioning prior to return to work.    [x] Alter current plan (see comments)-as above 8/12/19  [] Plan of care initiated [] Hold pending MD visit [] Discharge    Electronically signed by: Deric Pedroza, OT/L [id number]

## 2019-09-20 ENCOUNTER — HOSPITAL ENCOUNTER (OUTPATIENT)
Dept: OCCUPATIONAL THERAPY | Age: 64
Setting detail: THERAPIES SERIES
Discharge: HOME | End: 2019-09-20
Payer: COMMERCIAL

## 2019-09-20 PROCEDURE — 97110 THERAPEUTIC EXERCISES: CPT | Performed by: OCCUPATIONAL THERAPIST

## 2019-09-20 PROCEDURE — 97035 APP MDLTY 1+ULTRASOUND EA 15: CPT | Performed by: OCCUPATIONAL THERAPIST

## 2019-09-20 PROCEDURE — 97140 MANUAL THERAPY 1/> REGIONS: CPT | Performed by: OCCUPATIONAL THERAPIST

## 2019-09-24 ENCOUNTER — HOSPITAL ENCOUNTER (OUTPATIENT)
Dept: OCCUPATIONAL THERAPY | Age: 64
Setting detail: THERAPIES SERIES
Discharge: HOME | End: 2019-09-24
Payer: COMMERCIAL

## 2019-09-24 PROCEDURE — 97035 APP MDLTY 1+ULTRASOUND EA 15: CPT | Performed by: OCCUPATIONAL THERAPIST

## 2019-09-24 PROCEDURE — 97110 THERAPEUTIC EXERCISES: CPT | Performed by: OCCUPATIONAL THERAPIST

## 2019-09-24 PROCEDURE — 97140 MANUAL THERAPY 1/> REGIONS: CPT | Performed by: OCCUPATIONAL THERAPIST

## 2019-09-24 NOTE — FLOWSHEET NOTE
[facility], [address] [facility] [place]  Phone: [phone]  Fax [phone]      Hand Therapy Daily Treatment Note  Date:  2019    Patient: George Currie   : 1955   MRN: [id number]  Referring Physician: Referring Practitioner: Dr. Anaid Brown Diagnosis Information:  Diagnosis: s/p L distal biceps repair (C85.463Z)                                          Assessment: M25.522                                    Insurance information: OT Insurance Information: [facility]    Date of Surgery: 19      Visit # Insurance Allowable   15 12     Date of Patient follow up with Physician: 19     Functional Disability Rating: Quick DASH - 100%      Progress Note: []  Yes  [x]  No  Next due by: Visit #10      Latex Allergy:  [x]NO      []YES    Preferred Language for Healthcare:   [x]English       []other:    Pain level: 1- 2/10 at rest and with light use. Increases to 2--3/10    SUBJECTIVE:  Pt reports that he is still having soreness at wrist however is progressing with  Tolerance for use. Patient transfer from [facility] office. 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 Saw MD today, patient does not have restrictions for therapy, just to follow protocol, which is progressive strengthening at this point. And he does not need to use the brace. Has olecranon bursitis on left elbow, which he reported has been getting smaller, hurts to rest on his elbow. Gave him a donut pad made of blue memory foam inside stockinet to cushion olecranon. RESTRICTIONS/PRECAUTIONS: progress to strengthening.  19     OBJECTIVE:          Date:  7/17/19 7/24/19 8/8/19 8/12/19  8/16/19 8/20/19 8/23/19 8/27/19 8/30/19 9/3/19 9/6/19 9/13/19 9/17/19 9/20/19 9/24/19   Objective Measures:          Pain 2-4/10     2-3/10    Elbow AROM 15/115 10/130 0/130 WNL      WNL Wrist AROM 50/40 55/45 55/55       60/65     63/66   Sup/pron 45/40 45/50 45/50 WNL      WNL        MMT                  DASH 100%         64%         strength     R 108#  L 50#     R 99#   L 58#        Modalities:                  MHP   10' 10  INF + HP 15' INF + HP 15' HP 10' INF + HP 15' HP 10' 10' 10' 10' 10'    US        50% @ 1.7 8' radial side of wrist and forearm  8' 8' 10' 8' 8'                                                          Therapeutic Exercise, Activities, NMR:                  HEP/pt education 25' review review [place] Theraband for wrist, elbow, scap; s/p w 2 wts; chair press for triceps and wt bearing, wall push up- HEP-copy in chart  Radial nerve glide added to HEP    Revised HEP to reduce wrist pain      Discussed  stabilizing wrist while doing exercises at home to prevent pain        Power  with sponge                   Yellow putty kneading, rolling, mallet Yellow putty kneading, rolling, mallet      1# ball toss   Rotation wheel  5' 6'  Power  with sponge . #2 (Pt was unable to complete due to pain)  Finisher 2# 8' Finisher 2# 8' Finisher 3#  Finisher 4# 3# #4 #4        Red flex bar  5#        Red flex bar  5#        Red flex bar    putty     Yellow  Rolling, kneading Yellow  Rolling, kneading Yellow  Rolling, kneading Yellow  Rolling, kneading  Yellow  Rolling, kneading Yellow  Rolling, kneading Yellow  Rolling, kneading Power  with  Sponge  Wall pushups various positions 2x 10           Power web  airdyne 5' with LEs Airdyne 5' without LEs Pulleys 5' Airdyne 5' with LEs Airdyne 5'without LE Airdyne 5'without LE Airdyne 5'without LE   Manual therapy    DTM forearm extensor surface, volar elbow- is hypersensitive, but did tolerate, many areas of muscle tightness and soreness DTM forearm extensor surface, volar elbow- is hypersensitive, but did tolerate, many areas of muscle tightness and soreness.  Pt had difficulty tolerating SASTM DTM forearm

## 2019-09-27 ENCOUNTER — HOSPITAL ENCOUNTER (OUTPATIENT)
Dept: OCCUPATIONAL THERAPY | Age: 64
Setting detail: THERAPIES SERIES
Discharge: HOME | End: 2019-09-27
Payer: COMMERCIAL

## 2019-09-27 PROCEDURE — 97035 APP MDLTY 1+ULTRASOUND EA 15: CPT | Performed by: OCCUPATIONAL THERAPIST

## 2019-09-27 PROCEDURE — 97110 THERAPEUTIC EXERCISES: CPT | Performed by: OCCUPATIONAL THERAPIST

## 2019-09-27 PROCEDURE — 97140 MANUAL THERAPY 1/> REGIONS: CPT | Performed by: OCCUPATIONAL THERAPIST

## 2019-09-27 NOTE — FLOWSHEET NOTE
Continue per plan of care- Increase frequency to 2x/week, progressive strengthening in therapy, may benefit from eventual work conditioning prior to return to work.    [x] Alter current plan (see comments)-as above 8/12/19  [] Plan of care initiated [] Hold pending MD visit [] Discharge    Electronically signed by: Fany Montes OT/CELESTINE [id number]

## 2019-10-01 ENCOUNTER — HOSPITAL ENCOUNTER (OUTPATIENT)
Dept: OCCUPATIONAL THERAPY | Age: 64
Setting detail: THERAPIES SERIES
Discharge: HOME | End: 2019-10-01
Payer: COMMERCIAL

## 2019-10-01 PROCEDURE — 97110 THERAPEUTIC EXERCISES: CPT | Performed by: OCCUPATIONAL THERAPIST

## 2019-10-01 PROCEDURE — 97035 APP MDLTY 1+ULTRASOUND EA 15: CPT | Performed by: OCCUPATIONAL THERAPIST

## 2019-10-01 PROCEDURE — 97140 MANUAL THERAPY 1/> REGIONS: CPT | Performed by: OCCUPATIONAL THERAPIST

## 2019-10-04 ENCOUNTER — HOSPITAL ENCOUNTER (OUTPATIENT)
Dept: OCCUPATIONAL THERAPY | Age: 64
Setting detail: THERAPIES SERIES
Discharge: HOME | End: 2019-10-04
Payer: COMMERCIAL

## 2019-10-04 PROCEDURE — 97035 APP MDLTY 1+ULTRASOUND EA 15: CPT | Performed by: OCCUPATIONAL THERAPIST

## 2019-10-04 PROCEDURE — 97140 MANUAL THERAPY 1/> REGIONS: CPT | Performed by: OCCUPATIONAL THERAPIST

## 2019-10-04 PROCEDURE — 97110 THERAPEUTIC EXERCISES: CPT | Performed by: OCCUPATIONAL THERAPIST

## 2019-10-07 ENCOUNTER — OFFICE VISIT (OUTPATIENT)
Dept: ORTHOPEDIC SURGERY | Age: 64
End: 2019-10-07
Payer: COMMERCIAL

## 2019-10-07 VITALS — BODY MASS INDEX: 35.56 KG/M2 | WEIGHT: 240.08 LBS | HEIGHT: 69 IN

## 2019-10-07 DIAGNOSIS — S46.212S RUPTURE OF DISTAL BICEPS TENDON, LEFT, SEQUELA: Primary | ICD-10-CM

## 2019-10-07 PROCEDURE — 99213 OFFICE O/P EST LOW 20 MIN: CPT | Performed by: ORTHOPAEDIC SURGERY

## 2019-10-08 ENCOUNTER — HOSPITAL ENCOUNTER (OUTPATIENT)
Dept: OCCUPATIONAL THERAPY | Age: 64
Setting detail: THERAPIES SERIES
Discharge: HOME | End: 2019-10-08
Payer: COMMERCIAL

## 2019-10-08 DIAGNOSIS — S46.212S RUPTURE OF DISTAL BICEPS TENDON, LEFT, SEQUELA: Primary | ICD-10-CM

## 2019-10-08 PROCEDURE — 97140 MANUAL THERAPY 1/> REGIONS: CPT | Performed by: OCCUPATIONAL THERAPIST

## 2019-10-08 PROCEDURE — 97035 APP MDLTY 1+ULTRASOUND EA 15: CPT | Performed by: OCCUPATIONAL THERAPIST

## 2019-10-08 PROCEDURE — 97110 THERAPEUTIC EXERCISES: CPT | Performed by: OCCUPATIONAL THERAPIST

## 2019-10-11 ENCOUNTER — HOSPITAL ENCOUNTER (OUTPATIENT)
Dept: OCCUPATIONAL THERAPY | Age: 64
Setting detail: THERAPIES SERIES
Discharge: HOME | End: 2019-10-11
Payer: COMMERCIAL

## 2019-10-11 PROCEDURE — 97035 APP MDLTY 1+ULTRASOUND EA 15: CPT | Performed by: OCCUPATIONAL THERAPIST

## 2019-10-11 PROCEDURE — 97110 THERAPEUTIC EXERCISES: CPT | Performed by: OCCUPATIONAL THERAPIST

## 2019-10-11 PROCEDURE — 97140 MANUAL THERAPY 1/> REGIONS: CPT | Performed by: OCCUPATIONAL THERAPIST

## 2019-10-14 ENCOUNTER — OFFICE VISIT (OUTPATIENT)
Dept: FAMILY MEDICINE CLINIC | Age: 64
End: 2019-10-14
Payer: COMMERCIAL

## 2019-10-14 VITALS
OXYGEN SATURATION: 96 % | DIASTOLIC BLOOD PRESSURE: 70 MMHG | HEART RATE: 81 BPM | WEIGHT: 250 LBS | HEIGHT: 68 IN | SYSTOLIC BLOOD PRESSURE: 132 MMHG | BODY MASS INDEX: 37.89 KG/M2

## 2019-10-14 DIAGNOSIS — E66.01 SEVERE OBESITY (BMI 35.0-39.9) WITH COMORBIDITY (HCC): ICD-10-CM

## 2019-10-14 DIAGNOSIS — L21.9 SEBORRHEIC DERMATITIS OF SCALP: ICD-10-CM

## 2019-10-14 DIAGNOSIS — Z23 NEED FOR PROPHYLACTIC VACCINATION AND INOCULATION AGAINST VARICELLA: ICD-10-CM

## 2019-10-14 DIAGNOSIS — Z00.00 ANNUAL PHYSICAL EXAM: Primary | ICD-10-CM

## 2019-10-14 DIAGNOSIS — Z12.5 SCREENING FOR PROSTATE CANCER: ICD-10-CM

## 2019-10-14 DIAGNOSIS — R73.01 IFG (IMPAIRED FASTING GLUCOSE): ICD-10-CM

## 2019-10-14 LAB
CHOLESTEROL, TOTAL: 199 MG/DL (ref 0–199)
HDLC SERPL-MCNC: 55 MG/DL (ref 40–60)
LDL CHOLESTEROL CALCULATED: 111 MG/DL
PROSTATE SPECIFIC ANTIGEN: 0.02 NG/ML (ref 0–4)
TRIGL SERPL-MCNC: 167 MG/DL (ref 0–150)
VLDLC SERPL CALC-MCNC: 33 MG/DL

## 2019-10-14 PROCEDURE — 90472 IMMUNIZATION ADMIN EACH ADD: CPT | Performed by: INTERNAL MEDICINE

## 2019-10-14 PROCEDURE — 90686 IIV4 VACC NO PRSV 0.5 ML IM: CPT | Performed by: INTERNAL MEDICINE

## 2019-10-14 PROCEDURE — 82044 UR ALBUMIN SEMIQUANTITATIVE: CPT | Performed by: INTERNAL MEDICINE

## 2019-10-14 PROCEDURE — 99396 PREV VISIT EST AGE 40-64: CPT | Performed by: INTERNAL MEDICINE

## 2019-10-14 PROCEDURE — 90732 PPSV23 VACC 2 YRS+ SUBQ/IM: CPT | Performed by: INTERNAL MEDICINE

## 2019-10-14 PROCEDURE — 90471 IMMUNIZATION ADMIN: CPT | Performed by: INTERNAL MEDICINE

## 2019-10-14 RX ORDER — FLUTICASONE PROPIONATE 50 MCG
2 SPRAY, SUSPENSION (ML) NASAL DAILY
Qty: 1 BOTTLE | Refills: 2 | Status: SHIPPED | OUTPATIENT
Start: 2019-10-14

## 2019-10-15 ENCOUNTER — HOSPITAL ENCOUNTER (OUTPATIENT)
Dept: OCCUPATIONAL THERAPY | Age: 64
Setting detail: THERAPIES SERIES
Discharge: HOME | End: 2019-10-15
Payer: COMMERCIAL

## 2019-10-15 PROCEDURE — 97110 THERAPEUTIC EXERCISES: CPT | Performed by: OCCUPATIONAL THERAPIST

## 2019-10-15 PROCEDURE — 97140 MANUAL THERAPY 1/> REGIONS: CPT | Performed by: OCCUPATIONAL THERAPIST

## 2019-10-18 ENCOUNTER — APPOINTMENT (OUTPATIENT)
Dept: OCCUPATIONAL THERAPY | Age: 64
End: 2019-10-18
Payer: COMMERCIAL

## 2019-10-18 ENCOUNTER — HOSPITAL ENCOUNTER (OUTPATIENT)
Dept: OCCUPATIONAL THERAPY | Age: 64
Setting detail: THERAPIES SERIES
Discharge: HOME | End: 2019-10-18
Payer: COMMERCIAL

## 2019-10-18 PROCEDURE — 97110 THERAPEUTIC EXERCISES: CPT | Performed by: OCCUPATIONAL THERAPIST

## 2019-10-18 PROCEDURE — 97140 MANUAL THERAPY 1/> REGIONS: CPT | Performed by: OCCUPATIONAL THERAPIST

## 2019-10-21 ENCOUNTER — HOSPITAL ENCOUNTER (OUTPATIENT)
Dept: PHYSICAL THERAPY | Age: 64
Setting detail: THERAPIES SERIES
Discharge: HOME | End: 2019-10-21
Payer: COMMERCIAL

## 2019-10-21 PROCEDURE — 97750 PHYSICAL PERFORMANCE TEST: CPT

## 2019-10-23 ENCOUNTER — HOSPITAL ENCOUNTER (OUTPATIENT)
Dept: PHYSICAL THERAPY | Age: 64
Setting detail: THERAPIES SERIES
Discharge: HOME | End: 2019-10-23
Payer: COMMERCIAL

## 2019-10-23 PROCEDURE — W0710 HC WORK COND PROG PER 15 MIN: HCPCS

## 2019-10-24 ENCOUNTER — HOSPITAL ENCOUNTER (OUTPATIENT)
Dept: PHYSICAL THERAPY | Age: 64
Setting detail: THERAPIES SERIES
Discharge: HOME | End: 2019-10-24
Payer: COMMERCIAL

## 2019-10-24 PROCEDURE — W0710 HC WORK COND PROG PER 15 MIN: HCPCS

## 2019-10-25 ENCOUNTER — HOSPITAL ENCOUNTER (OUTPATIENT)
Dept: PHYSICAL THERAPY | Age: 64
Setting detail: THERAPIES SERIES
Discharge: HOME | End: 2019-10-25
Payer: COMMERCIAL

## 2019-10-25 PROCEDURE — W0710 HC WORK COND PROG PER 15 MIN: HCPCS

## 2019-10-28 ENCOUNTER — HOSPITAL ENCOUNTER (OUTPATIENT)
Dept: PHYSICAL THERAPY | Age: 64
Setting detail: THERAPIES SERIES
Discharge: HOME | End: 2019-10-28
Payer: COMMERCIAL

## 2019-10-28 PROCEDURE — W0710 HC WORK COND PROG PER 15 MIN: HCPCS

## 2019-10-29 ENCOUNTER — HOSPITAL ENCOUNTER (OUTPATIENT)
Dept: PHYSICAL THERAPY | Age: 64
Setting detail: THERAPIES SERIES
Discharge: HOME | End: 2019-10-29
Payer: COMMERCIAL

## 2019-10-29 PROCEDURE — W0710 HC WORK COND PROG PER 15 MIN: HCPCS

## 2019-10-30 ENCOUNTER — HOSPITAL ENCOUNTER (OUTPATIENT)
Dept: PHYSICAL THERAPY | Age: 64
Setting detail: THERAPIES SERIES
Discharge: HOME | End: 2019-10-30
Payer: COMMERCIAL

## 2019-10-30 PROCEDURE — W0710 HC WORK COND PROG PER 15 MIN: HCPCS

## 2019-10-31 ENCOUNTER — HOSPITAL ENCOUNTER (OUTPATIENT)
Dept: PHYSICAL THERAPY | Age: 64
Setting detail: THERAPIES SERIES
Discharge: HOME | End: 2019-10-31
Payer: COMMERCIAL

## 2019-10-31 PROCEDURE — W0710 HC WORK COND PROG PER 15 MIN: HCPCS

## 2019-11-01 ENCOUNTER — HOSPITAL ENCOUNTER (OUTPATIENT)
Dept: PHYSICAL THERAPY | Age: 64
Setting detail: THERAPIES SERIES
Discharge: HOME | End: 2019-11-01
Payer: COMMERCIAL

## 2019-11-05 ENCOUNTER — HOSPITAL ENCOUNTER (OUTPATIENT)
Dept: PHYSICAL THERAPY | Age: 64
Setting detail: THERAPIES SERIES
Discharge: HOME | End: 2019-11-05
Payer: COMMERCIAL

## 2019-11-05 PROCEDURE — W0710 HC WORK COND PROG PER 15 MIN: HCPCS

## 2019-11-06 ENCOUNTER — HOSPITAL ENCOUNTER (OUTPATIENT)
Dept: PHYSICAL THERAPY | Age: 64
Setting detail: THERAPIES SERIES
Discharge: HOME | End: 2019-11-06
Payer: COMMERCIAL

## 2019-11-06 PROCEDURE — W0710 HC WORK COND PROG PER 15 MIN: HCPCS

## 2019-11-07 ENCOUNTER — HOSPITAL ENCOUNTER (OUTPATIENT)
Dept: PHYSICAL THERAPY | Age: 64
Setting detail: THERAPIES SERIES
Discharge: HOME | End: 2019-11-07
Payer: COMMERCIAL

## 2019-11-07 PROCEDURE — W0710 HC WORK COND PROG PER 15 MIN: HCPCS

## 2019-11-08 ENCOUNTER — HOSPITAL ENCOUNTER (OUTPATIENT)
Dept: PHYSICAL THERAPY | Age: 64
Setting detail: THERAPIES SERIES
Discharge: HOME | End: 2019-11-08
Payer: COMMERCIAL

## 2019-11-08 PROCEDURE — W0710 HC WORK COND PROG PER 15 MIN: HCPCS

## 2019-11-11 ENCOUNTER — HOSPITAL ENCOUNTER (OUTPATIENT)
Dept: PHYSICAL THERAPY | Age: 64
Setting detail: THERAPIES SERIES
Discharge: HOME | End: 2019-11-11
Payer: COMMERCIAL

## 2019-11-11 PROCEDURE — W0710 HC WORK COND PROG PER 15 MIN: HCPCS

## 2019-11-12 ENCOUNTER — HOSPITAL ENCOUNTER (OUTPATIENT)
Dept: PHYSICAL THERAPY | Age: 64
Setting detail: THERAPIES SERIES
Discharge: HOME | End: 2019-11-12
Payer: COMMERCIAL

## 2019-11-12 PROCEDURE — W0710 HC WORK COND PROG PER 15 MIN: HCPCS

## 2019-11-13 ENCOUNTER — HOSPITAL ENCOUNTER (OUTPATIENT)
Dept: PHYSICAL THERAPY | Age: 64
Setting detail: THERAPIES SERIES
Discharge: HOME | End: 2019-11-13
Payer: COMMERCIAL

## 2019-11-13 PROCEDURE — W0710 HC WORK COND PROG PER 15 MIN: HCPCS

## 2019-11-14 ENCOUNTER — HOSPITAL ENCOUNTER (OUTPATIENT)
Dept: PHYSICAL THERAPY | Age: 64
Setting detail: THERAPIES SERIES
Discharge: HOME | End: 2019-11-14
Payer: COMMERCIAL

## 2019-11-14 PROCEDURE — W0710 HC WORK COND PROG PER 15 MIN: HCPCS

## 2019-11-15 ENCOUNTER — HOSPITAL ENCOUNTER (OUTPATIENT)
Dept: PHYSICAL THERAPY | Age: 64
Setting detail: THERAPIES SERIES
Discharge: HOME | End: 2019-11-15
Payer: COMMERCIAL

## 2019-11-15 PROCEDURE — W0710 HC WORK COND PROG PER 15 MIN: HCPCS

## 2019-11-18 ENCOUNTER — OFFICE VISIT (OUTPATIENT)
Dept: ORTHOPEDIC SURGERY | Age: 64
End: 2019-11-18
Payer: COMMERCIAL

## 2019-11-18 ENCOUNTER — HOSPITAL ENCOUNTER (OUTPATIENT)
Dept: PHYSICAL THERAPY | Age: 64
Setting detail: THERAPIES SERIES
Discharge: HOME | End: 2019-11-18
Payer: COMMERCIAL

## 2019-11-18 VITALS — BODY MASS INDEX: 37.89 KG/M2 | WEIGHT: 250 LBS | HEIGHT: 68 IN

## 2019-11-18 DIAGNOSIS — M25.532 LEFT WRIST PAIN: ICD-10-CM

## 2019-11-18 DIAGNOSIS — S46.212S RUPTURE OF DISTAL BICEPS TENDON, LEFT, SEQUELA: Primary | ICD-10-CM

## 2019-11-18 PROCEDURE — W0710 HC WORK COND PROG PER 15 MIN: HCPCS

## 2019-11-18 PROCEDURE — 99213 OFFICE O/P EST LOW 20 MIN: CPT | Performed by: ORTHOPAEDIC SURGERY

## 2019-11-19 ENCOUNTER — HOSPITAL ENCOUNTER (OUTPATIENT)
Dept: PHYSICAL THERAPY | Age: 64
Setting detail: THERAPIES SERIES
Discharge: HOME | End: 2019-11-19
Payer: COMMERCIAL

## 2019-11-19 PROCEDURE — W0710 HC WORK COND PROG PER 15 MIN: HCPCS

## 2019-11-20 ENCOUNTER — HOSPITAL ENCOUNTER (OUTPATIENT)
Dept: PHYSICAL THERAPY | Age: 64
Setting detail: THERAPIES SERIES
Discharge: HOME | End: 2019-11-20
Payer: COMMERCIAL

## 2019-11-20 PROCEDURE — W0710 HC WORK COND PROG PER 15 MIN: HCPCS

## 2019-11-21 ENCOUNTER — HOSPITAL ENCOUNTER (OUTPATIENT)
Dept: PHYSICAL THERAPY | Age: 64
Setting detail: THERAPIES SERIES
Discharge: HOME | End: 2019-11-21
Payer: COMMERCIAL

## 2019-11-21 PROCEDURE — W0710 HC WORK COND PROG PER 15 MIN: HCPCS

## 2019-11-22 ENCOUNTER — HOSPITAL ENCOUNTER (OUTPATIENT)
Dept: PHYSICAL THERAPY | Age: 64
Setting detail: THERAPIES SERIES
Discharge: HOME | End: 2019-11-22
Payer: COMMERCIAL

## 2019-11-22 PROCEDURE — W0710 HC WORK COND PROG PER 15 MIN: HCPCS

## 2019-11-25 ENCOUNTER — HOSPITAL ENCOUNTER (OUTPATIENT)
Dept: PHYSICAL THERAPY | Age: 64
Setting detail: THERAPIES SERIES
Discharge: HOME | End: 2019-11-25
Payer: COMMERCIAL

## 2019-11-25 PROCEDURE — W0710 HC WORK COND PROG PER 15 MIN: HCPCS

## 2019-11-26 ENCOUNTER — HOSPITAL ENCOUNTER (OUTPATIENT)
Dept: PHYSICAL THERAPY | Age: 64
Setting detail: THERAPIES SERIES
Discharge: HOME | End: 2019-11-26
Payer: COMMERCIAL

## 2019-11-26 PROCEDURE — W0710 HC WORK COND PROG PER 15 MIN: HCPCS

## 2019-11-27 ENCOUNTER — HOSPITAL ENCOUNTER (OUTPATIENT)
Dept: PHYSICAL THERAPY | Age: 64
Setting detail: THERAPIES SERIES
Discharge: HOME | End: 2019-11-27
Payer: COMMERCIAL

## 2019-11-27 PROCEDURE — W0710 HC WORK COND PROG PER 15 MIN: HCPCS

## 2019-11-29 ENCOUNTER — HOSPITAL ENCOUNTER (OUTPATIENT)
Dept: PHYSICAL THERAPY | Age: 64
Setting detail: THERAPIES SERIES
Discharge: HOME | End: 2019-11-29
Payer: COMMERCIAL

## 2019-11-29 PROCEDURE — W0710 HC WORK COND PROG PER 15 MIN: HCPCS

## 2019-12-02 ENCOUNTER — HOSPITAL ENCOUNTER (OUTPATIENT)
Dept: PHYSICAL THERAPY | Age: 64
Setting detail: THERAPIES SERIES
Discharge: HOME | End: 2019-12-02
Payer: COMMERCIAL

## 2019-12-02 DIAGNOSIS — M25.532 LEFT WRIST PAIN: Primary | ICD-10-CM

## 2019-12-03 ENCOUNTER — APPOINTMENT (OUTPATIENT)
Dept: PHYSICAL THERAPY | Age: 64
End: 2019-12-03
Payer: COMMERCIAL

## 2019-12-04 ENCOUNTER — OFFICE VISIT (OUTPATIENT)
Dept: ORTHOPEDIC SURGERY | Age: 64
End: 2019-12-04
Payer: COMMERCIAL

## 2019-12-04 ENCOUNTER — TELEPHONE (OUTPATIENT)
Dept: ORTHOPEDIC SURGERY | Age: 64
End: 2019-12-04

## 2019-12-04 ENCOUNTER — HOSPITAL ENCOUNTER (OUTPATIENT)
Dept: PHYSICAL THERAPY | Age: 64
Setting detail: THERAPIES SERIES
End: 2019-12-04
Payer: COMMERCIAL

## 2019-12-04 VITALS — WEIGHT: 230 LBS | BODY MASS INDEX: 34.07 KG/M2 | HEIGHT: 69 IN

## 2019-12-04 DIAGNOSIS — M18.12 ARTHRITIS OF CARPOMETACARPAL (CMC) JOINT OF LEFT THUMB: Primary | ICD-10-CM

## 2019-12-04 DIAGNOSIS — M25.532 LEFT WRIST PAIN: ICD-10-CM

## 2019-12-04 PROCEDURE — 99214 OFFICE O/P EST MOD 30 MIN: CPT | Performed by: ORTHOPAEDIC SURGERY

## 2019-12-04 PROCEDURE — L3924 HFO WITHOUT JOINTS PRE OTS: HCPCS | Performed by: ORTHOPAEDIC SURGERY

## 2019-12-05 ENCOUNTER — APPOINTMENT (OUTPATIENT)
Dept: PHYSICAL THERAPY | Age: 64
End: 2019-12-05
Payer: COMMERCIAL

## 2019-12-06 ENCOUNTER — APPOINTMENT (OUTPATIENT)
Dept: PHYSICAL THERAPY | Age: 64
End: 2019-12-06
Payer: COMMERCIAL

## 2019-12-09 DIAGNOSIS — S46.212S RUPTURE OF DISTAL BICEPS TENDON, LEFT, SEQUELA: ICD-10-CM

## 2019-12-09 DIAGNOSIS — M18.12 ARTHRITIS OF CARPOMETACARPAL (CMC) JOINT OF LEFT THUMB: ICD-10-CM

## 2019-12-09 DIAGNOSIS — M25.532 LEFT WRIST PAIN: Primary | ICD-10-CM

## 2019-12-09 RX ORDER — GABAPENTIN 300 MG/1
300 CAPSULE ORAL 3 TIMES DAILY
Qty: 90 CAPSULE | Refills: 0 | Status: SHIPPED | OUTPATIENT
Start: 2019-12-09 | End: 2019-12-10 | Stop reason: CLARIF

## 2019-12-10 RX ORDER — GABAPENTIN 300 MG/1
300 CAPSULE ORAL 3 TIMES DAILY
Qty: 90 CAPSULE | Refills: 0 | Status: SHIPPED | OUTPATIENT
Start: 2019-12-10 | End: 2020-01-09

## 2019-12-11 ENCOUNTER — APPOINTMENT (OUTPATIENT)
Dept: OCCUPATIONAL THERAPY | Age: 64
End: 2019-12-11
Payer: COMMERCIAL

## 2019-12-13 ENCOUNTER — HOSPITAL ENCOUNTER (OUTPATIENT)
Dept: OCCUPATIONAL THERAPY | Age: 64
Setting detail: THERAPIES SERIES
Discharge: HOME | End: 2019-12-13
Payer: COMMERCIAL

## 2019-12-13 ENCOUNTER — APPOINTMENT (OUTPATIENT)
Dept: OCCUPATIONAL THERAPY | Age: 64
End: 2019-12-13
Payer: COMMERCIAL

## 2019-12-13 PROCEDURE — 97110 THERAPEUTIC EXERCISES: CPT | Performed by: OCCUPATIONAL THERAPIST

## 2019-12-13 PROCEDURE — 97165 OT EVAL LOW COMPLEX 30 MIN: CPT | Performed by: OCCUPATIONAL THERAPIST

## 2019-12-13 PROCEDURE — G0283 ELEC STIM OTHER THAN WOUND: HCPCS | Performed by: OCCUPATIONAL THERAPIST

## 2019-12-16 ENCOUNTER — HOSPITAL ENCOUNTER (OUTPATIENT)
Dept: OCCUPATIONAL THERAPY | Age: 64
Setting detail: THERAPIES SERIES
Discharge: HOME | End: 2019-12-16
Payer: COMMERCIAL

## 2019-12-16 PROCEDURE — 97140 MANUAL THERAPY 1/> REGIONS: CPT | Performed by: OCCUPATIONAL THERAPIST

## 2019-12-16 PROCEDURE — G0283 ELEC STIM OTHER THAN WOUND: HCPCS | Performed by: OCCUPATIONAL THERAPIST

## 2019-12-19 ENCOUNTER — APPOINTMENT (OUTPATIENT)
Dept: OCCUPATIONAL THERAPY | Age: 64
End: 2019-12-19
Payer: COMMERCIAL

## 2019-12-19 ENCOUNTER — OFFICE VISIT (OUTPATIENT)
Dept: ORTHOPEDIC SURGERY | Age: 64
End: 2019-12-19
Payer: COMMERCIAL

## 2019-12-19 DIAGNOSIS — S56.212A TEAR OF PRONATOR MUSCLE, LEFT, INITIAL ENCOUNTER: Primary | ICD-10-CM

## 2019-12-19 PROCEDURE — 95909 NRV CNDJ TST 5-6 STUDIES: CPT | Performed by: PHYSICAL MEDICINE & REHABILITATION

## 2019-12-19 PROCEDURE — 95886 MUSC TEST DONE W/N TEST COMP: CPT | Performed by: PHYSICAL MEDICINE & REHABILITATION

## 2019-12-23 ENCOUNTER — APPOINTMENT (OUTPATIENT)
Dept: OCCUPATIONAL THERAPY | Age: 64
End: 2019-12-23
Payer: COMMERCIAL

## 2019-12-27 ENCOUNTER — APPOINTMENT (OUTPATIENT)
Dept: OCCUPATIONAL THERAPY | Age: 64
End: 2019-12-27
Payer: COMMERCIAL

## 2019-12-30 ENCOUNTER — APPOINTMENT (OUTPATIENT)
Dept: OCCUPATIONAL THERAPY | Age: 64
End: 2019-12-30
Payer: COMMERCIAL

## 2020-01-03 ENCOUNTER — APPOINTMENT (OUTPATIENT)
Dept: OCCUPATIONAL THERAPY | Age: 65
End: 2020-01-03
Payer: COMMERCIAL

## 2020-01-06 ENCOUNTER — OFFICE VISIT (OUTPATIENT)
Dept: ORTHOPEDIC SURGERY | Age: 65
End: 2020-01-06
Payer: COMMERCIAL

## 2020-01-06 VITALS — HEIGHT: 69 IN | BODY MASS INDEX: 34.06 KG/M2 | WEIGHT: 229.94 LBS

## 2020-01-06 PROCEDURE — 99213 OFFICE O/P EST LOW 20 MIN: CPT | Performed by: ORTHOPAEDIC SURGERY

## 2020-01-06 NOTE — PROGRESS NOTES
Department of Orthopedic Surgery   Progress Note      Follow-Up: EMG and nerve conduction study    Subjective:     He does report some improvement and interval periods of no pain. He still has activity related soreness. Objective:     @IPVITALS(24)@    Review of Systems  Pertinent items are noted in HPI  Denies fever, chills, confusion, bowel/bladder active change. Review of systems reviewed from Patient History Form dated on January 6 and available in the patient's chart under the Media tab. Pain Assessment  Location of Pain: Arm  Location Modifiers: Left  Severity of Pain: 6  Quality of Pain: Dull, Aching  Frequency of Pain: Intermittent  Aggravating Factors: Stretching, Straightening  Relieving Factors: Rest  Result of Injury: No  Work-Related Injury: Yes  Are there other pain locations you wish to document?: No    Exam: He continues to have some unusual hypersensitivity in the area of the tendon repair. Full elbow movement and strength. Good distal biceps contour with mild atrophy of the forearm. Imaging: Careful review of his EMG and nerve conduction study revealed moderate carpal tunnel signs. Office Procedures:      Assessment:     #1. Healed distal biceps tendon repair with some sensitivity over the repaired tendon     #2. 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 carpal tunnel syndrome moderate on EMG and nerve conduction study on December 19     #4. Mild symptoms of posttraumatic regional pain syndrome. Plan:      1: We had a lengthy discussion today. I have offered him a referral to hand surgery for consideration of a diagnostic and therapeutic carpal tunnel injection. He has declined. He does think he is improving. Going to remain on his present work restrictions. He did look at doing some physical therapy through his private insurance but the cost was prohibitive. He is can continue doing his home exercises.          Follow-Up Visit: 1 month. [address] [facility] of [facility] and Sports Medicine Surgery      This dictation was performed with a verbal recognition program (DRAGON) and it was checked for errors. It is possible that there are still dictated errors within this office note. If so, please bring any errors to my attention for an addendum. All efforts were made to ensure that this office note is accurate.

## 2020-01-07 ENCOUNTER — APPOINTMENT (OUTPATIENT)
Dept: OCCUPATIONAL THERAPY | Age: 65
End: 2020-01-07
Payer: COMMERCIAL

## 2020-01-10 ENCOUNTER — APPOINTMENT (OUTPATIENT)
Dept: OCCUPATIONAL THERAPY | Age: 65
End: 2020-01-10
Payer: COMMERCIAL

## 2020-01-17 ENCOUNTER — HOSPITAL ENCOUNTER (OUTPATIENT)
Dept: OCCUPATIONAL THERAPY | Age: 65
Setting detail: THERAPIES SERIES
Discharge: HOME | End: 2020-01-17
Payer: COMMERCIAL

## 2020-01-17 PROCEDURE — 97110 THERAPEUTIC EXERCISES: CPT | Performed by: OCCUPATIONAL THERAPIST

## 2020-01-17 PROCEDURE — G0283 ELEC STIM OTHER THAN WOUND: HCPCS | Performed by: OCCUPATIONAL THERAPIST

## 2020-01-17 PROCEDURE — 97140 MANUAL THERAPY 1/> REGIONS: CPT | Performed by: OCCUPATIONAL THERAPIST

## 2020-01-17 NOTE — FLOWSHEET NOTE
Provided verbal/tactile cueing for activities related to improving balance, coordination, kinesthetic sense, posture, motor skill, proprioception  to assist with  scapular, scapulothoracic and UE control with self care, reaching, carrying, lifting, house/yardwork, driving/computer work.     Therapeutic Activities & NMR:    [] (45338 or 14199) Provided verbal/tactile cueing for activities related to improving balance, coordination, kinesthetic sense, posture, motor skill, proprioception and motor activation to allow for proper function of scapular, scapulothoracic and UE control with self care, carrying, lifting, driving/computer work    Home Exercise Program:    [x] (45974) Reviewed/Progressed HEP activities related to strengthening, flexibility, endurance, ROM of scapular, scapulothoracic and UE control with self care, reaching, carrying, lifting, house/yardwork, driving/computer work  [] (46548) Reviewed/Progressed HEP activities related to improving balance, coordination, kinesthetic sense, posture, motor skill, proprioception of scapular, scapulothoracic and UE control with self care, reaching, carrying, lifting, house/yardwork, driving/computer work      Manual Treatments:  PROM / STM / Oscillations-Mobs:  G-I, II, III, IV (PA's, Inf., Post.)  [x] (06881) Provided manual therapy to mobilize soft tissue/joints of cervical/CT, scapular GHJ and UE for the purpose of modulating pain, promoting relaxation,  increasing ROM, reducing/eliminating soft tissue swelling/inflammation/restriction, improving soft tissue extensibility and allowing for proper ROM for normal function with self care, reaching, carrying, lifting, house/yardwork, driving/computer work    ADL Training:  [] (58363) Provided self-care/home management training related to activities of daily living and compensatory training, and/or use of adaptive equipment      Charges:  Timed Code Treatment Minutes: 30   Total Treatment Minutes: 45   Worker's Comp: Time In/Time Out   1:45-2:30     [] EVAL (LOW) 27325 (typically 20 minutes face-to-face) 2:00-2:15    [] EVAL (MOD) 75175 (typically 30 minutes face-to-face)  [] EVAL (HIGH) 33107 (typically 45 minutes face-to-face)  [] OT Re-eval (65844)       [x] Cande (16021) x  1    2:10-2:30     [] ECPIT(80696)  [] NMR (84656) x      [] Estim (attended) (99037)   [x] Manual ([id number]) x  1    2:00-2:10    [] US (46894)  [] TA (07621) x      [] Paraffin (71375)  [] ADL  (65953) x     [] Splint/L code:    [x] Estim (unattended) (41676) 1:45-2:00  [] Fluidotherapy (51155)  [] Other:    ASSESSMENT:  Progress hindered secondary to CTS, encouraged pt. To get cortisone injection and wear splint at night but he was opposed to both. GOALS:  Patient stated goal: be able to work     []? Progressing: []? Met: []? Not Met: []? Adjusted     Therapist goals for Patient:   Short Term Goals: To be achieved in: 2 weeks  1. Independent in HEP and progression per patient tolerance, in order to prevent re-injury. []? Progressing: []? Met: []? Not Met: []? Adjusted   2. Patient will have a decrease in pain to facilitate improvement in movement, function, and ADLs as indicated by Functional Deficits. []? Progressing: []? Met: []? Not Met: []? Adjusted     Long Term Goals to be achieved in 8 weeks (through 2/13/19), including patient directed goals to address patient identified performance deficits:  1) Pt to be independent in graded HEP progression with a good level of effort and compliance. []? Progressing: []? Met: []? Not Met: []? Adjusted   2) Pt to report a score of </= 20% on the Quick DASH disability questionnaire for increased performance with carrying, moving, and handling objects. []? Progressing: []? Met: []? Not Met: []? Adjusted   3) Pt will demonstrate increased ROM to L wrist to = R for improved independence with work tasks. []? Progressing: []? Met: []? Not Met: []?  Adjusted   4) Pt will demonstrate increased strength to L

## 2020-01-21 ENCOUNTER — HOSPITAL ENCOUNTER (OUTPATIENT)
Dept: OCCUPATIONAL THERAPY | Age: 65
Setting detail: THERAPIES SERIES
Discharge: HOME | End: 2020-01-21
Payer: COMMERCIAL

## 2020-01-21 PROCEDURE — G0283 ELEC STIM OTHER THAN WOUND: HCPCS | Performed by: OCCUPATIONAL THERAPIST

## 2020-01-21 PROCEDURE — 97110 THERAPEUTIC EXERCISES: CPT | Performed by: OCCUPATIONAL THERAPIST

## 2020-01-21 PROCEDURE — 97140 MANUAL THERAPY 1/> REGIONS: CPT | Performed by: OCCUPATIONAL THERAPIST

## 2020-01-21 NOTE — FLOWSHEET NOTE
[address] [facility], [address] [place] [address] Chago Al  Phone: [phone]  Fax [phone]    Occupational Therapy Treatment Note/ Progress Report:     Is this a Progress Report:     []  Yes  [x]  No      If Yes:  Date Range for reporting period:  Beginning 19  Ending  Progress report will be due (10 Rx or 30 days whichever is less):     Recertification will be due (POC Duration  / 90 days whichever is less): 19  Date:  2020  Patient Name:  Mahendra Forrest    :  1955  MRN: [id number]  Medical/Treatment Diagnosis Information:  · Diagnosis: L thumb CMC OA, L CTS, Treatment dx: L hand pain M79.542   ·       Insurance/Certification information:   [facility]  Physician Information:  Referring Practitioner: Dr. Apple Ibanez  Has the plan of care been signed (Y/N):        []  Yes  [x]  No   Comorbidities Affecting Functional Performance:     []Anxiety (F41.9)/Depression (F32.9)   []Diabetes Type 1(E10.65) or 2 (E11.65)   []Rheumatoid Arthritis (M05.9)  []Fibromyalgia (M79.7)  []Neuropathy(G60.9)  [x]Osteoarthritis(M19.91)  []None   []Other:    Visit # Insurance Allowable Auth Required   4 10 []  Yes []  No    From 19  to 2020    Date of Injury: symptoms worsened Nov after starting work conditioning   Date of Surgery: 2019 bicep reinsertion     Date of Patient follow up with Physician: as needed    RESTRICTIONS/PRECAUTIONS: none . . moderate CTS      Latex Allergy:  [x]No      []Yes  Pacemaker:  [x] No       [] Yes     Preferred Language for Healthcare:   [x]English       []other:     Functional Scale: 68% (Quick DASH)   Date assessed:  2020    SUBJECTIVE: I think that it might be a little less painful at rest. Spoke with MD today and he said that 1x/week for therapy was an appropriate amount. He assured the patient that it could be 12-18 mo from surgery before he would be pain free.        Hx:Currently working light duty. EMG shows moderate CTS. Pt. Refused cortisone injection. Pain Scale: 4/10 from fingers to elbow          OBJECTIVE:   Date:  Hand Dominance:     [x]? Right    []? Left 12/13/2019 1/17/20    Objective Measures:      PAIN 4/10 4/10    Quick DASH 68%                                Digits tip to DPFC in cm Can make a loose fist with pain     Thumb ROM MP 55  IP 59    Thumb opposition  R:  L:    Thumb Radial/Palmar abd ROM R:  L:    Wrist ROM Ext/Flex R: 64/65  L: 60/55 L: 60/61   Rad/Uln dev ROM R:  L:    Forearm ROM  Sup/pron R:  L:    Elbow ROM Ext/flex R:  L:    Shoulder Flex  Shoulder Abd  Shoulder IR/ER      Edema in cm circumf. Wrist  R: 19.7  L: 20.1    Edema in cm circumf. Wrist R:  L:     strength in lbs R: 95#  L: 54# L: 55#   Pinch Strengthin lbs: lat  R: 30#  L: 27#    Pinch Strength in lbs:  3 point R:  L:       MMT:       Observations:  (including splints, bandages, incisions, scars):              MODALITIES: 12/13/19 1/17/20 1/21/20    Fluidotherapy (44726)      Estim (62597/41335) IFC and HP and HP on upper traps  15' 15'   Paraffin (29841)      US (45822)      Iontophoresis (80734)      Hot Pack      Cold Pack            INTERVENTIONS:      Therapeutic Exercise (64277)       Forearm stretching, corner stretch, SS, and fist pump issued for HEP see media.  Cues for demo x 5 each  Prayer stretch    Flex bar red x 20 each  X 10       X 20 green     S/p 2 wts x 15 x 2     Forearm stretch x 10           Education and precautions            Therapeutic Activity (87779)                              Manual Therapy (75476)      (IASTM, Dry Needling, manual mobilization)  SASTM to forearm dorsal and volar 10' 10'         Neuromuscular Reeducation (03695)                  ADL Training (12310)                  HEP Training/Review See sheet(s)                 Splinting      Lcode:      Orthotic Mgmt, Subsequent Enc (00599)      Orthotic Mgmt & Training (03003)            Other: Therapeutic Exercise & NMR:  [x] (96838) Provided verbal/tactile cueing for activities related to strengthening, flexibility, endurance, ROM  for improvements in scapular, scapulothoracic and UE control with self care, reaching, carrying, lifting, house/yardwork, driving/computer work.    [] (53704) Provided verbal/tactile cueing for activities related to improving balance, coordination, kinesthetic sense, posture, motor skill, proprioception  to assist with  scapular, scapulothoracic and UE control with self care, reaching, carrying, lifting, house/yardwork, driving/computer work.     Therapeutic Activities & NMR:    [] (66093 or 68965) Provided verbal/tactile cueing for activities related to improving balance, coordination, kinesthetic sense, posture, motor skill, proprioception and motor activation to allow for proper function of scapular, scapulothoracic and UE control with self care, carrying, lifting, driving/computer work    Home Exercise Program:    [x] (43398) Reviewed/Progressed HEP activities related to strengthening, flexibility, endurance, ROM of scapular, scapulothoracic and UE control with self care, reaching, carrying, lifting, house/yardwork, driving/computer work  [] (12534) Reviewed/Progressed HEP activities related to improving balance, coordination, kinesthetic sense, posture, motor skill, proprioception of scapular, scapulothoracic and UE control with self care, reaching, carrying, lifting, house/yardwork, driving/computer work      Manual Treatments:  PROM / STM / Oscillations-Mobs:  G-I, II, III, IV (PA's, Inf., Post.)  [x] (82278) Provided manual therapy to mobilize soft tissue/joints of cervical/CT, scapular GHJ and UE for the purpose of modulating pain, promoting relaxation,  increasing ROM, reducing/eliminating soft tissue swelling/inflammation/restriction, improving soft tissue extensibility and allowing for proper ROM for normal function with self care, reaching, carrying, lifting, house/yardwork, driving/computer work    ADL Training:  [] (39642) Provided self-care/home management training related to activities of daily living and compensatory training, and/or use of adaptive equipment      Charges:  Timed Code Treatment Minutes: 30   Total Treatment Minutes: 45   Worker's Comp: Time In/Time Out        [] EVAL (LOW) 83036 (typically 20 minutes face-to-face)    [] EVAL (MOD) 70764 (typically 30 minutes face-to-face)  [] EVAL (HIGH) [phone] (typically 45 minutes face-to-face)  [] OT Re-eval (17524)       [x] Cande [phone]) x  12:55-3:15        [] AJCHK(69227)  [] NMR (73763) x      [] Estim (attended) (88847)   [x] Manual ([id number]) x  1 2:45-2:55       [] US (18097)  [] TA (92321) x      [] Paraffin (01033)  [] ADL  ([phone]) x     [] Splint/L code:    [x] Estim (unattended) (22 [phone]) 2:30-2:45  [] Fluidotherapy (72861)  [] Other:    ASSESSMENT:  Progress hindered secondary to CTS, encouraged pt. To get cortisone injection and wear splint at night but he was opposed to both. GOALS:  Patient stated goal: be able to work     []? Progressing: []? Met: []? Not Met: []? Adjusted     Therapist goals for Patient:   Short Term Goals: To be achieved in: 2 weeks  1. Independent in HEP and progression per patient tolerance, in order to prevent re-injury. [x]? Progressing: []? Met: []? Not Met: []? Adjusted   2. Patient will have a decrease in pain to facilitate improvement in movement, function, and ADLs as indicated by Functional Deficits. [x]? Progressing: []? Met: []? Not Met: []? Adjusted     Long Term Goals to be achieved in 8 weeks (through 2/13/19), including patient directed goals to address patient identified performance deficits:  1) Pt to be independent in graded HEP progression with a good level of effort and compliance. []? Progressing: []? Met: []? Not Met: []?  Adjusted   2) Pt to report a score of </= 20% on the Quick DASH disability questionnaire for increased performance with carrying, moving, and handling objects. []? Progressing: []? Met: []? Not Met: []? Adjusted   3) Pt will demonstrate increased ROM to L wrist to = R for improved independence with work tasks. []? Progressing: []? Met: []? Not Met: []? Adjusted   4) Pt will demonstrate increased strength to L  10# for improved independence with opening things . []? Progressing: []? Met: []? Not Met: []? Adjusted   5) Pt will have a decrease in pain to 2/10 to facilitate pulling on socks . []? Progressing: []? Met: []? Not Met: []? Adjusted          Overall Progression Towards Functional Goals/Treatment Progress Update:  [] Patient is progressing as expected towards functional goals listed. [] Progression is slowed due to complexities/impairments listed. [] Progression has been slowed due to co-morbidities. [x] Plan just implemented, too soon to assess goals progression <30 days  [] Goals require adjustment due to lack of progress  [] Patient is not progressing as expected and requires additional follow up with physician  [] All goals are met  [] Other:     Prognosis for POC: [x] Good [] Fair  [] Poor    Patient requires continued skilled intervention: [x] Yes  [] No    Treatment/Activity Tolerance:  [x] Patient able to complete treatment  [] Patient limited by fatigue  [] Patient limited by pain    [] Patient limited by other medical complications  [] Other:                  PLAN: See eval  [x] Continue per plan of care [x] Alter current plan (see comments above) Cont 1x/week   [] Plan of care initiated [] Hold pending MD visit [] Discharge      Electronically signed by:  Florence Lemon OTR/L [id number]    Note: If patient does not return for scheduled/ recommended follow up visits, this note will serve as a discharge from care along with most recent update on progress.

## 2020-01-23 ENCOUNTER — TELEPHONE (OUTPATIENT)
Dept: ORTHOPEDIC SURGERY | Age: 65
End: 2020-01-23

## 2020-01-23 ENCOUNTER — APPOINTMENT (OUTPATIENT)
Dept: OCCUPATIONAL THERAPY | Age: 65
End: 2020-01-23
Payer: COMMERCIAL

## 2020-01-28 ENCOUNTER — APPOINTMENT (OUTPATIENT)
Dept: OCCUPATIONAL THERAPY | Age: 65
End: 2020-01-28
Payer: COMMERCIAL

## 2020-01-28 ENCOUNTER — HOSPITAL ENCOUNTER (OUTPATIENT)
Dept: OCCUPATIONAL THERAPY | Age: 65
Setting detail: THERAPIES SERIES
Discharge: HOME | End: 2020-01-28
Payer: COMMERCIAL

## 2020-01-28 PROCEDURE — G0283 ELEC STIM OTHER THAN WOUND: HCPCS | Performed by: OCCUPATIONAL THERAPIST

## 2020-01-28 PROCEDURE — 97110 THERAPEUTIC EXERCISES: CPT | Performed by: OCCUPATIONAL THERAPIST

## 2020-01-28 PROCEDURE — 97140 MANUAL THERAPY 1/> REGIONS: CPT | Performed by: OCCUPATIONAL THERAPIST

## 2020-01-28 NOTE — FLOWSHEET NOTE
[address] [facility], [address] [place] [address] Chago Al  Phone: [phone]  Fax [phone]    Occupational Therapy Treatment Note/ Progress Report:     Is this a Progress Report:     []  Yes  [x]  No      If Yes:  Date Range for reporting period:  Beginning 19  Ending  Progress report will be due (10 Rx or 30 days whichever is less): 28    Recertification will be due (POC Duration  / 90 days whichever is less): 19  Date:  2020  Patient Name:  Durga Lanier    :  1955  MRN: [id number]  Medical/Treatment Diagnosis Information:  · Diagnosis: L thumb CMC OA, L CTS, Treatment dx: L hand pain M79.542   ·       Insurance/Certification information:   [facility]  Physician Information:  Referring Practitioner: Dr. Coleen Matthews  Has the plan of care been signed (Y/N):        []  Yes  [x]  No   Comorbidities Affecting Functional Performance:     []Anxiety (F41.9)/Depression (F32.9)   []Diabetes Type 1(E10.65) or 2 (E11.65)   []Rheumatoid Arthritis (M05.9)  []Fibromyalgia (M79.7)  []Neuropathy(G60.9)  [x]Osteoarthritis(M19.91)  []None   []Other:    Visit # Insurance Allowable Auth Required   5 10 []  Yes []  No    From 19  to 2020    Date of Injury: symptoms worsened Nov after starting work conditioning   Date of Surgery: 2019 bicep reinsertion     Date of Patient follow up with Physician: as needed    RESTRICTIONS/PRECAUTIONS: none . . moderate CTS      Latex Allergy:  [x]No      []Yes  Pacemaker:  [x] No       [] Yes     Preferred Language for Healthcare:   [x]English       []other:     Functional Scale: 68% (Quick DASH)   Date assessed:  2020    SUBJECTIVE: Pt reports cont pain and weakness 4/10 with use. Resting pain has improved to 0-1/10 and decreased stiffness in fingers. C/o pain on both dorsal and volar sides of wrist and forearm. Hx:Currently working light duty. EMG shows moderate CTS.  Pt. Refused Lcode:       Orthotic Mgmt, Subsequent Enc (46023)       Orthotic Mgmt & Training (97610)              Other:                                     Therapeutic Exercise & NMR:  [x] (55557) Provided verbal/tactile cueing for activities related to strengthening, flexibility, endurance, ROM  for improvements in scapular, scapulothoracic and UE control with self care, reaching, carrying, lifting, house/yardwork, driving/computer work.    [] (32308) Provided verbal/tactile cueing for activities related to improving balance, coordination, kinesthetic sense, posture, motor skill, proprioception  to assist with  scapular, scapulothoracic and UE control with self care, reaching, carrying, lifting, house/yardwork, driving/computer work.     Therapeutic Activities & NMR:    [] (85335 or 45967) Provided verbal/tactile cueing for activities related to improving balance, coordination, kinesthetic sense, posture, motor skill, proprioception and motor activation to allow for proper function of scapular, scapulothoracic and UE control with self care, carrying, lifting, driving/computer work    Home Exercise Program:    [x] (86661) Reviewed/Progressed HEP activities related to strengthening, flexibility, endurance, ROM of scapular, scapulothoracic and UE control with self care, reaching, carrying, lifting, house/yardwork, driving/computer work  [] (83157) Reviewed/Progressed HEP activities related to improving balance, coordination, kinesthetic sense, posture, motor skill, proprioception of scapular, scapulothoracic and UE control with self care, reaching, carrying, lifting, house/yardwork, driving/computer work      Manual Treatments:  PROM / STM / Oscillations-Mobs:  G-I, II, III, IV (PA's, Inf., Post.)  [x] (57047) Provided manual therapy to mobilize soft tissue/joints of cervical/CT, scapular GHJ and UE for the purpose of modulating pain, promoting relaxation,  increasing ROM, reducing/eliminating soft tissue Progressing: []? Met: []? Not Met: []? Adjusted   2) Pt to report a score of </= 20% on the Quick DASH disability questionnaire for increased performance with carrying, moving, and handling objects. []? Progressing: []? Met: []? Not Met: []? Adjusted   3) Pt will demonstrate increased ROM to L wrist to = R for improved independence with work tasks. []? Progressing: []? Met: []? Not Met: []? Adjusted   4) Pt will demonstrate increased strength to L  10# for improved independence with opening things . []? Progressing: []? Met: []? Not Met: []? Adjusted   5) Pt will have a decrease in pain to 2/10 to facilitate pulling on socks . []? Progressing: []? Met: []? Not Met: []? Adjusted          Overall Progression Towards Functional Goals/Treatment Progress Update:  [] Patient is progressing as expected towards functional goals listed. [] Progression is slowed due to complexities/impairments listed. [] Progression has been slowed due to co-morbidities. [x] Plan just implemented, too soon to assess goals progression <30 days  [] Goals require adjustment due to lack of progress  [] Patient is not progressing as expected and requires additional follow up with physician  [] All goals are met  [] Other:     Prognosis for POC: [x] Good [] Fair  [] Poor    Patient requires continued skilled intervention: [x] Yes  [] No    Treatment/Activity Tolerance:  [x] Patient able to complete treatment  [] Patient limited by fatigue  [] Patient limited by pain    [] Patient limited by other medical complications  [] Other:                  PLAN: See eval  [x] Continue per plan of care [x] Alter current plan (see comments above) Cont 1x/week   [] Plan of care initiated [] Hold pending MD visit [] Discharge      Electronically signed by:  Jignesh Bruce OTR/L [id number]    Note: If patient does not return for scheduled/ recommended follow up visits, this note will serve as a discharge from care along with most recent update on progress.

## 2020-01-31 ENCOUNTER — APPOINTMENT (OUTPATIENT)
Dept: OCCUPATIONAL THERAPY | Age: 65
End: 2020-01-31
Payer: COMMERCIAL

## 2020-02-03 ENCOUNTER — APPOINTMENT (OUTPATIENT)
Dept: OCCUPATIONAL THERAPY | Age: 65
End: 2020-02-03
Payer: COMMERCIAL

## 2020-02-04 ENCOUNTER — OFFICE VISIT (OUTPATIENT)
Dept: ORTHOPEDIC SURGERY | Age: 65
End: 2020-02-04
Payer: COMMERCIAL

## 2020-02-04 ENCOUNTER — APPOINTMENT (OUTPATIENT)
Dept: OCCUPATIONAL THERAPY | Age: 65
End: 2020-02-04
Payer: COMMERCIAL

## 2020-02-04 ENCOUNTER — HOSPITAL ENCOUNTER (OUTPATIENT)
Dept: OCCUPATIONAL THERAPY | Age: 65
Setting detail: THERAPIES SERIES
Discharge: HOME | End: 2020-02-04
Payer: COMMERCIAL

## 2020-02-04 VITALS — HEIGHT: 69 IN | WEIGHT: 229.94 LBS | BODY MASS INDEX: 34.06 KG/M2

## 2020-02-04 PROCEDURE — 99214 OFFICE O/P EST MOD 30 MIN: CPT | Performed by: ORTHOPAEDIC SURGERY

## 2020-02-04 PROCEDURE — G0283 ELEC STIM OTHER THAN WOUND: HCPCS | Performed by: OCCUPATIONAL THERAPIST

## 2020-02-04 NOTE — FLOWSHEET NOTE
Neuromuscular Reeducation (10661)                        ADL Training (53980)                        HEP Training/Review See sheet(s)                       Splinting        Lcode:        Orthotic Mgmt, Subsequent Enc (46288)        Orthotic Mgmt & Training (14227)                Other:                                          Therapeutic Exercise & NMR:  [x] (21529) Provided verbal/tactile cueing for activities related to strengthening, flexibility, endurance, ROM  for improvements in scapular, scapulothoracic and UE control with self care, reaching, carrying, lifting, house/yardwork, driving/computer work.    [] (34706) Provided verbal/tactile cueing for activities related to improving balance, coordination, kinesthetic sense, posture, motor skill, proprioception  to assist with  scapular, scapulothoracic and UE control with self care, reaching, carrying, lifting, house/yardwork, driving/computer work.     Therapeutic Activities & NMR:    [] (49836 or 35833) Provided verbal/tactile cueing for activities related to improving balance, coordination, kinesthetic sense, posture, motor skill, proprioception and motor activation to allow for proper function of scapular, scapulothoracic and UE control with self care, carrying, lifting, driving/computer work    Home Exercise Program:    [x] (05219) Reviewed/Progressed HEP activities related to strengthening, flexibility, endurance, ROM of scapular, scapulothoracic and UE control with self care, reaching, carrying, lifting, house/yardwork, driving/computer work  [] (25696) Reviewed/Progressed HEP activities related to improving balance, coordination, kinesthetic sense, posture, motor skill, proprioception of scapular, scapulothoracic and UE control with self care, reaching, carrying, lifting, house/yardwork, driving/computer work      Manual Treatments:  PROM / STM / Oscillations-Mobs:  G-I, II, III, IV (PA's, Inf., Post.)  [x] (22695) Provided manual therapy to mobilize soft tissue/joints of cervical/CT, scapular GHJ and UE for the purpose of modulating pain, promoting relaxation,  increasing ROM, reducing/eliminating soft tissue swelling/inflammation/restriction, improving soft tissue extensibility and allowing for proper ROM for normal function with self care, reaching, carrying, lifting, house/yardwork, driving/computer work    ADL Training:  [] (24980) Provided self-care/home management training related to activities of daily living and compensatory training, and/or use of adaptive equipment      Charges:  Timed Code Treatment Minutes: 0   Total Treatment Minutes: 25'   Worker's Comp: Time In/Time Out        [] EVAL (LOW) 11099 (typically 20 minutes face-to-face)    [] EVAL (MOD) 20784 (typically 30 minutes face-to-face)  [] EVAL (HIGH) 31821 (typically 45 minutes face-to-face)  [] OT Re-eval (18307)       [] Cande [phone]) x          [] SZVWD(34619)  [] NMR (20229) x      [] Estim (attended) (71988)   [] Manual ([id number]) x       [] US (40545)  [] TA (12469) x      [] Paraffin (52869)  [] ADL  (20714) x     [] Splint/L code:    [x] Estim (unattended) ([phone]) 2:00-2:15 [] Fluidotherapy (54384)  [] Other:    ASSESSMENT:  Pt is not making progress, it appears that OT tx is not able to provide long term relief to Moderate CTS. Appears that Pt has received maximum benefit from therapy at this time. GOALS:  Patient stated goal: be able to work     []? Progressing: []? Met: []? Not Met: []? Adjusted     Therapist goals for Patient:   Short Term Goals: To be achieved in: 2 weeks  1. Independent in HEP and progression per patient tolerance, in order to prevent re-injury. [x]? Progressing: []? Met: []? Not Met: []? Adjusted   2. Patient will have a decrease in pain to facilitate improvement in movement, function, and ADLs as indicated by Functional Deficits. []? Progressing: []? Met: [x]? Not Met: []?  Adjusted     Long Term Goals to be achieved in 8 weeks (through 2/13/19), including patient directed goals to address patient identified performance deficits:  1) Pt to be independent in graded HEP progression with a good level of effort and compliance. []? Progressing: []? Met: [x]? Not Met: []? Adjusted   2) Pt to report a score of </= 20% on the Quick DASH disability questionnaire for increased performance with carrying, moving, and handling objects. []? Progressing: []? Met: [x]? Not Met: []? Adjusted   3) Pt will demonstrate increased ROM to L wrist to = R for improved independence with work tasks. []? Progressing: []? Met: [x]? Not Met: []? Adjusted   4) Pt will demonstrate increased strength to L  10# for improved independence with opening things . []? Progressing: []? Met: [x]? Not Met: []? Adjusted   5) Pt will have a decrease in pain to 2/10 to facilitate pulling on socks . []? Progressing: []? Met: [x]? Not Met: []? Adjusted          Overall Progression Towards Functional Goals/Treatment Progress Update:  [] Patient is progressing as expected towards functional goals listed. [] Progression is slowed due to complexities/impairments listed. [] Progression has been slowed due to co-morbidities. [x] Plan just implemented, too soon to assess goals progression <30 days  [] Goals require adjustment due to lack of progress  [] Patient is not progressing as expected and requires additional follow up with physician  [] All goals are met  [] Other:     Prognosis for POC: [x] Good [] Fair  [] Poor    Patient requires continued skilled intervention: [x] Yes  [] No    Treatment/Activity Tolerance:  [x] Patient able to complete treatment  [] Patient limited by fatigue  [] Patient limited by pain    [] Patient limited by other medical complications  [] Other:                  PLAN: Have discussed recommendations for discontinuing OT tx with MD who will be seeing Pt today for follow up  Evaluation.   [] Continue per plan of care [] Alter current plan (see comments above) Cont

## 2020-02-11 ENCOUNTER — APPOINTMENT (OUTPATIENT)
Dept: OCCUPATIONAL THERAPY | Age: 65
End: 2020-02-11
Payer: COMMERCIAL

## 2020-02-18 ENCOUNTER — APPOINTMENT (OUTPATIENT)
Dept: OCCUPATIONAL THERAPY | Age: 65
End: 2020-02-18
Payer: COMMERCIAL

## 2020-02-25 ENCOUNTER — APPOINTMENT (OUTPATIENT)
Dept: OCCUPATIONAL THERAPY | Age: 65
End: 2020-02-25
Payer: COMMERCIAL

## 2020-02-27 ENCOUNTER — TELEPHONE (OUTPATIENT)
Dept: ORTHOPEDIC SURGERY | Age: 65
End: 2020-02-27

## 2020-02-27 NOTE — TELEPHONE ENCOUNTER
Called patient to let him know his FCE was approved, and to call therapy to schedule. Also suggested he cancel his appointment on Monday and make one after his FCE. Patient called me back and said he didn't understand what my VM said. I explained to him again that his FCE was approved, he needs to do that, then follow up with Dr. Yaron Baer. He kept asking if I read the second opinion, which I have. He said \"the other doctor said he wasn't ready and doesn't need an FCE\" which isn't what is written in his note. I told him he can just refuse to have it and come see Dr. Yaron Baer on Monday to discuss.

## 2020-03-02 ENCOUNTER — OFFICE VISIT (OUTPATIENT)
Dept: ORTHOPEDIC SURGERY | Age: 65
End: 2020-03-02
Payer: COMMERCIAL

## 2020-03-02 VITALS — BODY MASS INDEX: 34.06 KG/M2 | HEIGHT: 69 IN | WEIGHT: 229.94 LBS

## 2020-03-02 PROCEDURE — 99213 OFFICE O/P EST LOW 20 MIN: CPT | Performed by: ORTHOPAEDIC SURGERY

## 2020-03-02 NOTE — PROGRESS NOTES
dictated errors within this office note. If so, please bring any errors to my attention for an addendum. All efforts were made to ensure that this office note is accurate.

## 2020-03-25 ENCOUNTER — TELEPHONE (OUTPATIENT)
Dept: FAMILY MEDICINE CLINIC | Age: 65
End: 2020-03-25

## 2020-03-26 ENCOUNTER — TELEPHONE (OUTPATIENT)
Dept: FAMILY MEDICINE CLINIC | Age: 65
End: 2020-03-26

## 2020-03-31 ENCOUNTER — PATIENT MESSAGE (OUTPATIENT)
Dept: FAMILY MEDICINE CLINIC | Age: 65
End: 2020-03-31

## 2020-04-01 RX ORDER — ALBUTEROL SULFATE 90 UG/1
2 AEROSOL, METERED RESPIRATORY (INHALATION) EVERY 6 HOURS PRN
Qty: 1 INHALER | Refills: 3 | Status: SHIPPED | OUTPATIENT
Start: 2020-04-01 | End: 2021-04-21

## 2020-04-13 ENCOUNTER — OFFICE VISIT (OUTPATIENT)
Dept: ORTHOPEDIC SURGERY | Age: 65
End: 2020-04-13
Payer: COMMERCIAL

## 2020-04-13 VITALS — BODY MASS INDEX: 34.06 KG/M2 | WEIGHT: 229.94 LBS | HEIGHT: 69 IN

## 2020-04-13 PROCEDURE — 99442 PR PHYS/QHP TELEPHONE EVALUATION 11-20 MIN: CPT | Performed by: ORTHOPAEDIC SURGERY

## 2020-04-13 NOTE — PROGRESS NOTES
Juan R Ruiz is a 63 y.o. male evaluated via telephone on 4/13/2020. Consent:  He and/or health care decision maker is aware that that he may receive a bill for this telephone service, depending on his insurance coverage, and has provided verbal consent to proceed: Yes      Documentation:  I communicated with the patient and/or health care decision maker about follow-up from his distal biceps repair. Details of this discussion including any medical advice provided: We discussed the fact and he has reported to me today on the phone that his day-to-day resting symptoms are greatly improved. He is quite pleased with this. 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 On the several occasions that he has been forced to do some light lifting activities he has had some a moderate soreness that persisted for 2 to 3 days. He is still concerned that he is carpal tunnel syndrome is not being covered by his Worker's Compensation claim. My advice to him has been to continue to focus on his general health and wellness. Perform his stretching and strengthening exercises at home is much as possible. Persistent attention to his shoulder and neck posture. I will need to see him back in a month hopefully for an in person visit and will be able to discuss advancing his work. I affirm this is a Patient Initiated Episode with an Established Patient who has not had a related appointment within my department in the past 7 days or scheduled within the next 24 hours.     Total Time: minutes: 11-20 minutes    Note: not billable if this call serves to triage the patient into an appointment for the relevant concern      Gt Vidales

## 2020-05-12 ENCOUNTER — OFFICE VISIT (OUTPATIENT)
Dept: ORTHOPEDIC SURGERY | Age: 65
End: 2020-05-12
Payer: COMMERCIAL

## 2020-05-12 VITALS — BODY MASS INDEX: 34.06 KG/M2 | WEIGHT: 229.94 LBS | HEIGHT: 69 IN

## 2020-05-12 PROCEDURE — 99213 OFFICE O/P EST LOW 20 MIN: CPT | Performed by: ORTHOPAEDIC SURGERY

## 2020-05-12 NOTE — PROGRESS NOTES
above.  A caregiver was present when appropriate. Due to this being a TeleHealth encounter (During FKSVN-51 public health emergency), evaluation of the following organ systems was limited: Vitals/Constitutional/EENT/Resp/CV/GI//MS/Neuro/Skin/Heme-Lymph-Imm. Pursuant to the emergency declaration under the [address], [address] [place] [address] authority and the [employer] Act, this Virtual Visit was conducted with patient's (and/or legal guardian's) consent, to reduce the patient's risk of exposure to COVID-19 and provide necessary medical care. The patient (and/or legal guardian) has also been advised to contact this office for worsening conditions or problems, and seek emergency medical treatment and/or call 911 if deemed necessary. Patient identification was verified at the start of the visit: Yes    Total time spent for this encounter: 15 minutes    Services were provided through a video synchronous discussion virtually to substitute for in-person clinic visit. Patient and provider were located at their individual homes. --Donna Garcia MD on 5/12/2020 at 9:34 AM    An electronic signature was used to authenticate this note. Follow-Up Visit: 1 month            [facility] and Sports Medicine Surgery      This dictation was performed with a verbal recognition program (DRAGON) and it was checked for errors. It is possible that there are still dictated errors within this office note. If so, please bring any errors to my attention for an addendum. All efforts were made to ensure that this office note is accurate.

## 2020-06-15 ENCOUNTER — OFFICE VISIT (OUTPATIENT)
Dept: ORTHOPEDIC SURGERY | Age: 65
End: 2020-06-15
Payer: COMMERCIAL

## 2020-06-15 VITALS — WEIGHT: 229 LBS | HEIGHT: 68 IN | BODY MASS INDEX: 34.71 KG/M2

## 2020-06-15 PROCEDURE — 99213 OFFICE O/P EST LOW 20 MIN: CPT | Performed by: ORTHOPAEDIC SURGERY

## 2020-07-13 ENCOUNTER — OFFICE VISIT (OUTPATIENT)
Dept: ORTHOPEDIC SURGERY | Age: 65
End: 2020-07-13
Payer: COMMERCIAL

## 2020-07-13 VITALS — HEIGHT: 68 IN | WEIGHT: 229 LBS | BODY MASS INDEX: 34.71 KG/M2

## 2020-07-13 PROCEDURE — 99213 OFFICE O/P EST LOW 20 MIN: CPT | Performed by: ORTHOPAEDIC SURGERY

## 2020-07-13 NOTE — PROGRESS NOTES
Department of Orthopedic Surgery   Progress Note      Follow-Up: Left distal biceps repair June 26, 2019      Subjective:     Adam Tate is a very pleasant 79-year-old gentleman status post the above-mentioned surgery. Postoperatively he had quite a bit of trouble with myofascial pain through the forearm. Symptoms potentially related to underlying carpal tunnel. He has had several flareups of pain which is prevented his moving forward with therapy. He was also delayed by the coronavirus pandemic and limited physical therapy access. Recently, he does feel he is making great improvements. He is able to tolerate his work conditioning very well. However he does report some generalized soreness through the forearm and wrist after activity. Objective:     @IPVITALS(24)@    Review of Systems  Pertinent items are noted in HPI  Denies fever, chills, confusion, bowel/bladder active change. Review of systems reviewed from Patient History Form dated on July 13 and available in the patient's chart under the Media tab. Pain Assessment  Location of Pain: Arm  Location Modifiers: Left  Quality of Pain: Aching  Frequency of Pain: Intermittent  Limiting Behavior: Some  Relieving Factors: Rest  Result of Injury: Yes  Work-Related Injury: Yes  Are there other pain locations you wish to document?: No    Exam: On exam today he does have full flexion, extension, pronation and supination. He has a palpably intact distal biceps tendon. There is perhaps some atrophy appreciated through the forearm versus the contralateral dominant hand. I feel that all of his strength is symmetric other than supination which is perhaps a grade diminished. Imaging: None    Office Procedures:      Assessment:     Healed distal biceps tendon repair. .     Plan:      1: We had a long visit today. Mr. Dayanna Feliz is concerned that work will elicit increased pain response.   I think this a very reasonable concern and I hope that is minimal.  I do feel he has not a structurally intact distal biceps tendon. He has good joint stability and range of movement. At 1 year I think is appropriate to return him back to work. If at this point he is unable to demonstrate his ability to perform his work requirements then he would require a functional capacity evaluation for specific objective delineation of permanent restrictions. Follow-Up Visit: As needed            [address] [facility] and Sports Medicine Surgery      This dictation was performed with a verbal recognition program (DRAGON) and it was checked for errors. It is possible that there are still dictated errors within this office note. If so, please bring any errors to my attention for an addendum. All efforts were made to ensure that this office note is accurate.

## 2020-07-17 ENCOUNTER — TELEPHONE (OUTPATIENT)
Dept: ORTHOPEDIC SURGERY | Age: 65
End: 2020-07-17

## 2020-07-17 NOTE — TELEPHONE ENCOUNTER
Patient requesting FMLA forms be updated to 8 days per month. He states he is worried he will \"hurt is arm and may need time to heal would like more than 1 day per week\"     Dr. Priya Amezcua said it was ok to update FMLA to 8 days per month. Scanned into chart.

## 2020-12-14 ENCOUNTER — OFFICE VISIT (OUTPATIENT)
Dept: FAMILY MEDICINE CLINIC | Age: 65
End: 2020-12-14
Payer: COMMERCIAL

## 2020-12-14 VITALS
TEMPERATURE: 97.5 F | HEART RATE: 86 BPM | WEIGHT: 247 LBS | SYSTOLIC BLOOD PRESSURE: 120 MMHG | HEIGHT: 70 IN | DIASTOLIC BLOOD PRESSURE: 70 MMHG | BODY MASS INDEX: 35.36 KG/M2 | OXYGEN SATURATION: 99 %

## 2020-12-14 DIAGNOSIS — C61 PROSTATE CANCER (HCC): ICD-10-CM

## 2020-12-14 DIAGNOSIS — E11.9 TYPE 2 DIABETES MELLITUS WITHOUT COMPLICATION, WITHOUT LONG-TERM CURRENT USE OF INSULIN (HCC): ICD-10-CM

## 2020-12-14 DIAGNOSIS — E66.01 SEVERE OBESITY (BMI 35.0-39.9) WITH COMORBIDITY (HCC): ICD-10-CM

## 2020-12-14 LAB
A/G RATIO: 2 (ref 1.1–2.2)
ALBUMIN SERPL-MCNC: 4.3 G/DL (ref 3.4–5)
ALP BLD-CCNC: 89 U/L (ref 40–129)
ALT SERPL-CCNC: 16 U/L (ref 10–40)
ANION GAP SERPL CALCULATED.3IONS-SCNC: 14 MMOL/L (ref 3–16)
AST SERPL-CCNC: 12 U/L (ref 15–37)
BILIRUB SERPL-MCNC: 0.4 MG/DL (ref 0–1)
BUN BLDV-MCNC: 12 MG/DL (ref 7–20)
CALCIUM SERPL-MCNC: 9.2 MG/DL (ref 8.3–10.6)
CHLORIDE BLD-SCNC: 104 MMOL/L (ref 99–110)
CHOLESTEROL, TOTAL: 203 MG/DL (ref 0–199)
CO2: 25 MMOL/L (ref 21–32)
CREAT SERPL-MCNC: 0.9 MG/DL (ref 0.8–1.3)
CREATININE URINE: 216.9 MG/DL (ref 39–259)
GFR AFRICAN AMERICAN: >60
GFR NON-AFRICAN AMERICAN: >60
GLOBULIN: 2.2 G/DL
GLUCOSE BLD-MCNC: 123 MG/DL (ref 70–99)
HBA1C MFR BLD: 7.2 %
HDLC SERPL-MCNC: 58 MG/DL (ref 40–60)
LDL CHOLESTEROL CALCULATED: 132 MG/DL
MICROALBUMIN UR-MCNC: 1.3 MG/DL
MICROALBUMIN/CREAT UR-RTO: 6 MG/G (ref 0–30)
POTASSIUM SERPL-SCNC: 4.2 MMOL/L (ref 3.5–5.1)
PROSTATE SPECIFIC ANTIGEN: <0.01 NG/ML (ref 0–4)
SODIUM BLD-SCNC: 143 MMOL/L (ref 136–145)
TOTAL PROTEIN: 6.5 G/DL (ref 6.4–8.2)
TRIGL SERPL-MCNC: 66 MG/DL (ref 0–150)
VLDLC SERPL CALC-MCNC: 13 MG/DL

## 2020-12-14 PROCEDURE — 90715 TDAP VACCINE 7 YRS/> IM: CPT | Performed by: STUDENT IN AN ORGANIZED HEALTH CARE EDUCATION/TRAINING PROGRAM

## 2020-12-14 PROCEDURE — 83036 HEMOGLOBIN GLYCOSYLATED A1C: CPT | Performed by: STUDENT IN AN ORGANIZED HEALTH CARE EDUCATION/TRAINING PROGRAM

## 2020-12-14 PROCEDURE — 90686 IIV4 VACC NO PRSV 0.5 ML IM: CPT | Performed by: STUDENT IN AN ORGANIZED HEALTH CARE EDUCATION/TRAINING PROGRAM

## 2020-12-14 PROCEDURE — 90472 IMMUNIZATION ADMIN EACH ADD: CPT | Performed by: STUDENT IN AN ORGANIZED HEALTH CARE EDUCATION/TRAINING PROGRAM

## 2020-12-14 PROCEDURE — 99396 PREV VISIT EST AGE 40-64: CPT | Performed by: STUDENT IN AN ORGANIZED HEALTH CARE EDUCATION/TRAINING PROGRAM

## 2020-12-14 PROCEDURE — 90471 IMMUNIZATION ADMIN: CPT | Performed by: STUDENT IN AN ORGANIZED HEALTH CARE EDUCATION/TRAINING PROGRAM

## 2020-12-14 RX ORDER — PANTOPRAZOLE SODIUM 40 MG/1
40 TABLET, DELAYED RELEASE ORAL
Qty: 90 TABLET | Refills: 1 | Status: SHIPPED | OUTPATIENT
Start: 2020-12-14 | End: 2021-03-29

## 2020-12-14 RX ORDER — ATORVASTATIN CALCIUM 20 MG/1
20 TABLET, FILM COATED ORAL DAILY
Qty: 30 TABLET | Refills: 3 | Status: SHIPPED | OUTPATIENT
Start: 2020-12-14 | End: 2021-03-29 | Stop reason: SDUPTHER

## 2020-12-14 ASSESSMENT — ENCOUNTER SYMPTOMS
SHORTNESS OF BREATH: 0
NAUSEA: 0
DIARRHEA: 0
CONSTIPATION: 0
VOMITING: 0

## 2020-12-14 ASSESSMENT — PATIENT HEALTH QUESTIONNAIRE - PHQ9
SUM OF ALL RESPONSES TO PHQ QUESTIONS 1-9: 0
2. FEELING DOWN, DEPRESSED OR HOPELESS: 0
SUM OF ALL RESPONSES TO PHQ QUESTIONS 1-9: 0
1. LITTLE INTEREST OR PLEASURE IN DOING THINGS: 0
SUM OF ALL RESPONSES TO PHQ9 QUESTIONS 1 & 2: 0
SUM OF ALL RESPONSES TO PHQ QUESTIONS 1-9: 0

## 2020-12-14 NOTE — PROGRESS NOTES
Bob Lynch  YOB: 1955    Date of Service:  12/14/2020    Chief Complaint:   Bob Lynch is a 63 y.o. male who presents for a preventative visit    HPI:    Annual physical    Concerns:     Hx of joshi's esophagus. Follows with GI. Feels that he has constant cough with eating or drinking. Previous Colonoscopy: yes - 2010  BRBR, unexplained WL, bloating, abd pain: No  Family Hx of Colon Ca:  no    Exercise: Limited due to difficulty following biceps tendon repair. Has recently purchased a bowflex and is using recombinant bike. Diet: Fair    Self-testicular exams: Yes  Sexual activity: no  PSA testing: Yes    Smoker: Stopped 1990  AAA screening: no    Wt Readings from Last 3 Encounters:   12/14/20 247 lb (112 kg)   07/13/20 229 lb (103.9 kg)   06/15/20 229 lb (103.9 kg)     BP Readings from Last 3 Encounters:   12/14/20 120/70   10/14/19 132/70   06/26/19 (!) 154/72       Patient Active Problem List   Diagnosis    Joshi's esophagus    Prostate cancer ([facility] [address]    GERD (gastroesophageal reflux disease)    Shoulder impingement    Biceps muscle tear    Rotator cuff tear    Mild intermittent asthma without complication    Allergic rhinitis due to animal hair and dander    IFG (impaired fasting glucose)    Seborrheic dermatitis of scalp    Severe obesity (BMI 35.0-39. 9) with comorbidity [facility])       Health Maintenance   Topic Date Due    Hepatitis C screen  1955    Diabetic retinal exam  01/26/1967    HIV screen  01/26/1972    Diabetic microalbuminuria test  01/26/1975    Shingles Vaccine (1 of 2) 01/26/2007    Colon cancer screen colonoscopy  09/27/2020    Lipid screen  10/14/2020    PSA counseling  10/14/2020    Diabetic foot exam  12/14/2021    A1C test (Diabetic or Prediabetic)  12/14/2021    DTaP/Tdap/Td vaccine (3 - Td) 12/14/2030    Flu vaccine  Completed    Pneumococcal 0-64 years Vaccine  Completed    Hepatitis A vaccine  Aged Out  Prostate cancer [facility])     Urology group Dr. Red Brink, s/p radioactive seed implants. also following with oncology. Past Surgical History:   Procedure Laterality Date    BICEPS TENDON REPAIR Left 2019    LEFT DISTAL BICEPS REPAIR -BLOCK- performed by Renuka Conway MD at [address] [place] [address] Drive  9/10    KIDNEY STONE SURGERY      PROSTATE SURGERY  approx     prostate biopsy, radiation seeds    SHOULDER ARTHROSCOPY Right 12-2-15    RT SHOULDER DVA; SAD; ROTATOR CUFF REPAIR; BICEPS TENODESIS     UPPER GASTROINTESTINAL ENDOSCOPY  ,     Dr. Nato Rodriguez.   repeat 2017     Family History   Problem Relation Age of Onset    Cancer Mother         breast    Diabetes Mother     Diabetes Father     Hypertension Father     Parkinsonism Other      Social History     Socioeconomic History    Marital status:      Spouse name: Not on file    Number of children: Not on file    Years of education: Not on file    Highest education level: Not on file   Occupational History    Not on file   Social Needs    Financial resource strain: Not on file    Food insecurity     Worry: Not on file     Inability: Not on file    Transportation needs     Medical: Not on file     Non-medical: Not on file   Tobacco Use    Smoking status: Former Smoker     Packs/day: 1.00     Years: 20.00     Pack years: 20.00     Types: Cigarettes     Quit date: 1990     Years since quittin.0    Smokeless tobacco: Never Used   Substance and Sexual Activity    Alcohol use: Yes     Comment: 7-14 drinks per week    Drug use: No    Sexual activity: Not on file   Lifestyle    Physical activity     Days per week: Not on file     Minutes per session: Not on file    Stress: Not on file   Relationships    Social connections     Talks on phone: Not on file     Gets together: Not on file     Attends Caodaism service: Not on file     Active member of club or organization: Not on file Attends meetings of clubs or organizations: Not on file     Relationship status: Not on file    Intimate partner violence     Fear of current or ex partner: Not on file     Emotionally abused: Not on file     Physically abused: Not on file     Forced sexual activity: Not on file   Other Topics Concern    Not on file   Social History Narrative    Not on file       Review of Systems:  Review of Systems   Constitutional: Negative for chills and fever. Eyes: Negative for visual disturbance. Respiratory: Negative for shortness of breath. Cardiovascular: Negative for chest pain and palpitations. Gastrointestinal: Negative for constipation, diarrhea, nausea and vomiting. Genitourinary: Negative for difficulty urinating. Musculoskeletal: Negative for gait problem. Skin: Negative for rash. Neurological: Negative for dizziness and light-headedness. Psychiatric/Behavioral: Negative for confusion and decreased concentration. Physical Exam:   Vitals:    12/14/20 0754   BP: 120/70   Site: Left Upper Arm   Position: Sitting   Cuff Size: Large Adult   Pulse: 86   Temp: 97.5 °F (36.4 °C)   TempSrc: Temporal   SpO2: 99%   Weight: 247 lb (112 kg)   Height: 5' 9.6\" (1.768 m)     Body mass index is 35.85 kg/m². Physical Exam  Vitals signs reviewed. Constitutional:       General: He is not in acute distress. Appearance: Normal appearance. He is not ill-appearing. HENT:      Head: Normocephalic and atraumatic. Right Ear: Tympanic membrane normal.      Left Ear: Tympanic membrane normal.   Eyes:      Extraocular Movements: Extraocular movements intact. Conjunctiva/sclera: Conjunctivae normal.   Cardiovascular:      Rate and Rhythm: Normal rate and regular rhythm. Pulses: Normal pulses. Heart sounds: Normal heart sounds. No murmur. Pulmonary:      Effort: Pulmonary effort is normal.      Breath sounds: Normal breath sounds.    Abdominal: General: Abdomen is flat. Bowel sounds are normal. There is no distension. Palpations: Abdomen is soft. Tenderness: There is no abdominal tenderness. Musculoskeletal: Normal range of motion. General: No swelling. Right lower leg: No edema. Left lower leg: No edema. Skin:     General: Skin is warm and dry. Capillary Refill: Capillary refill takes less than 2 seconds. Findings: No rash. Neurological:      General: No focal deficit present. Mental Status: He is alert and oriented to person, place, and time. Psychiatric:         Mood and Affect: Mood normal.         Behavior: Behavior normal.         Thought Content: Thought content normal.         Judgment: Judgment normal.         Visual inspection:  Deformity/amputation: absent  Skin lesions/pre-ulcerative calluses: absent  Edema: right- negative, left- negative    Sensory exam:  Monofilament sensation: normal  (minimum of 5 random plantar locations tested, avoiding callused areas - > 1 area with absence of sensation is + for neuropathy)    Plus at least one of the following:  Pulses: normal,     Assessment/Plan:    Encounter for annual physical exam  Patient reports no acute concerns today besides chronic cough and Arevalo's esophagus. 1. Type 2 diabetes mellitus without complication, without long-term current use of insulin (HCC)  A1c worsened from 6.6 18 months ago to 7.2 today. Patient reports that he was unclear on previous diagnosis of diabetes. Recommended prompt follow-up to have further discussion regarding new diagnosis of diabetes however patient wishes to wait 3 months for follow-up when lab can be rechecked. Discussed multiple medications today including Lipitor. Patient in agreement to start Lipitor and will plan to increase to higher dose at next visit. Recommended diet and exercise. Does not wish to start Metformin at this time.   - POCT glycosylated hemoglobin (Hb A1C) - COMPREHENSIVE METABOLIC PANEL; Future  - MICROALBUMIN / CREATININE URINE RATIO; Future  - Diabetic Foot Exam  - atorvastatin (LIPITOR) 20 MG tablet; Take 1 tablet by mouth daily  Dispense: 30 tablet; Refill: 3    2. Severe obesity (BMI 35.0-39. 9) with comorbidity ([address]  Discussed diet and exercise. Recheck lipid panel.  - LIPID PANEL; Future    3. Prostate cancer [facility])  Reports that PSA has been stable since radiation therapy. Recheck today. - Psa screening; Future    4. Arevalo's esophagus without dysplasia  Reports chronic cough which she relates to Arevalo's esophagus. Has previously seen GI DrIban For EGD and colonoscopy. Due for repeat of both. Does not remember which doctor he saw before and wishes to see a new one will call back with physician that he wishes referral placed. Discussed possible swallow eval for further evaluation of chronic cough. - pantoprazole (PROTONIX) 40 MG tablet; Take 1 tablet by mouth every morning (before breakfast)  Dispense: 90 tablet; Refill: 1    5. Need for influenza vaccination  - INFLUENZA, QUADV, 3 YRS AND OLDER, IM PF, PREFILL SYR OR SDV, 0.5ML (AFLURIA QUADV, PF)    6. Need for Tdap vaccination  - Tdap (age 6y and older) IM ([address])      While assessing care for this patient, I have reviewed all pertinent lab work/imaging/ specialist notes and care in reference to those problems addressed above in detail. Appropriate medical decision making was based on this. Please note that portions of this note may have been completed with a voice recognition program. Efforts were made to edit the dictations but occasionally words are mis-transcribed. Return in about 3 months (around 3/14/2021) for Diabetic follow-up.     Milton Singh    12/14/20

## 2020-12-14 NOTE — PROGRESS NOTES
Vaccine Information Sheet, \"Influenza - Inactivated\"  given to Lance Lemon, or parent/legal guardian of  Lance Lemon and verbalized understanding. Patient responses:    Have you ever had a reaction to a flu vaccine? No  Do you have any current illness? No  Have you ever had Guillian [place] Syndrome? No  Do you have a serious allergy to any of the follow: Neomycin, Polymyxin, Thimerosal, eggs or egg products? No    Flu vaccine given per order. Please see immunization tab. Risks and benefits explained. Current VIS given.       Immunizations Administered     Name Date Dose Route    Influenza, Quadv, IM, PF (6 mo and older Fluzone, Flulaval, Fluarix, and 3 yrs and older Afluria) 12/14/2020 0.5 mL Intramuscular    Site: Deltoid- Right    Lot: [id number]    NDC: 36406-804-33    Tdap (Boostrix, Adacel) 12/14/2020 0.5 mL Intramuscular    Site: Deltoid- Left    Lot: [id number]    NDC: 26272-800-20

## 2021-03-12 ENCOUNTER — TELEPHONE (OUTPATIENT)
Dept: FAMILY MEDICINE CLINIC | Age: 66
End: 2021-03-12

## 2021-03-12 NOTE — TELEPHONE ENCOUNTER
Spoke with pt's wife (MIRAROLAND) and advised I would need bill since zero balance in [place] inquiry. She will fax to office to my attention.     Pt wife understands there will be follow up

## 2021-03-22 ENCOUNTER — TELEPHONE (OUTPATIENT)
Dept: FAMILY MEDICINE CLINIC | Age: 66
End: 2021-03-22

## 2021-03-22 DIAGNOSIS — E78.2 MIXED HYPERLIPIDEMIA: ICD-10-CM

## 2021-03-22 DIAGNOSIS — E11.9 TYPE 2 DIABETES MELLITUS WITHOUT COMPLICATION, WITHOUT LONG-TERM CURRENT USE OF INSULIN (HCC): Primary | ICD-10-CM

## 2021-03-22 DIAGNOSIS — Z11.4 ENCOUNTER FOR SCREENING FOR HIV: ICD-10-CM

## 2021-03-22 DIAGNOSIS — C61 PROSTATE CANCER (HCC): ICD-10-CM

## 2021-03-22 DIAGNOSIS — Z11.59 NEED FOR HEPATITIS C SCREENING TEST: ICD-10-CM

## 2021-03-22 NOTE — TELEPHONE ENCOUNTER
Please review/advise. Pt is scheduled to see you 3/29/21 for DM follow up. Per below message pt request labs ordered prior to appt. to discuss results at [facility] Do you wish to see pt first at appt, or can you please order?      Thank you

## 2021-03-22 NOTE — TELEPHONE ENCOUNTER
Call to pt re billing issue - LM requesting a call back. The invoice was sent to patient's new insurance instead of his insurance from date of service. This is being rebilled and the account is in suspense and will NOT be put to collections. Confirmed with wife that new  insurance would be rebilled.

## 2021-03-22 NOTE — TELEPHONE ENCOUNTER
----- Message from Ti Craft sent at 3/22/2021 10:40 AM EDT -----  Subject: Appointment Request    Reason for Call: Routine Existing Condition Follow Up (Diabetes)    QUESTIONS  Type of Appointment? Established Patient  Reason for appointment request? No appointments available during search  Additional Information for Provider? Patient is wanting to schedule an   appointment for routine diabetes management as soon as possible. He is   also wanting a blood test as his BP is running high. Please call to   schedule   ---------------------------------------------------------------------------  --------------  CALL BACK INFO  What is the best way for the office to contact you? OK to leave message on   voicemail   OK to respond with electronic message via [employer] portal (only for   patients who have registered [employer] account)  Preferred Call Back Phone Number? [phone]  ---------------------------------------------------------------------------  --------------  SCRIPT ANSWERS  Relationship to Patient? Other  Representative Name? Rahel Claudio  Additional information verified (besides Name and Date of Birth)? Address  Appointment reason? Well Care/Follow Ups  Select a Well Care/Follow Ups appointment reason? Adult Existing Condition   Follow Up [Diabetes   CHF   COPD   Hypertension/Blood Pressure Check]  (Is the patient requesting to be seen urgently for their symptoms?)? No  Is this follow up request related to routine Diabetes Management? Yes  Are you having any new concerns about your existing condition? No  Have you been diagnosed with   tested for   or told that you are suspected of having COVID-19 (Coronavirus)? No  Have you had a fever or taken medication to treat a fever within the past   3 days? No  Have you had a cough   shortness of breath or flu-like symptoms within the past 3 days?  No  Do you currently have flu-like symptoms including fever or chills   cough   shortness of breath   or difficulty breathing or new loss of taste or smell? No  (Service Expert  click yes below to proceed with [employer] As Usual   Scheduling)?  Yes

## 2021-03-25 DIAGNOSIS — C61 PROSTATE CANCER (HCC): ICD-10-CM

## 2021-03-25 DIAGNOSIS — E11.9 TYPE 2 DIABETES MELLITUS WITHOUT COMPLICATION, WITHOUT LONG-TERM CURRENT USE OF INSULIN (HCC): ICD-10-CM

## 2021-03-25 DIAGNOSIS — Z11.59 NEED FOR HEPATITIS C SCREENING TEST: ICD-10-CM

## 2021-03-25 DIAGNOSIS — E78.2 MIXED HYPERLIPIDEMIA: ICD-10-CM

## 2021-03-25 LAB
A/G RATIO: 2 (ref 1.1–2.2)
ALBUMIN SERPL-MCNC: 4.3 G/DL (ref 3.4–5)
ALP BLD-CCNC: 82 U/L (ref 40–129)
ALT SERPL-CCNC: 29 U/L (ref 10–40)
ANION GAP SERPL CALCULATED.3IONS-SCNC: 12 MMOL/L (ref 3–16)
AST SERPL-CCNC: 16 U/L (ref 15–37)
BASOPHILS ABSOLUTE: 0.1 K/UL (ref 0–0.2)
BASOPHILS RELATIVE PERCENT: 0.7 %
BILIRUB SERPL-MCNC: 0.5 MG/DL (ref 0–1)
BUN BLDV-MCNC: 11 MG/DL (ref 7–20)
CALCIUM SERPL-MCNC: 9.6 MG/DL (ref 8.3–10.6)
CHLORIDE BLD-SCNC: 103 MMOL/L (ref 99–110)
CHOLESTEROL, TOTAL: 122 MG/DL (ref 0–199)
CO2: 26 MMOL/L (ref 21–32)
CREAT SERPL-MCNC: 0.9 MG/DL (ref 0.8–1.3)
EOSINOPHILS ABSOLUTE: 0.1 K/UL (ref 0–0.6)
EOSINOPHILS RELATIVE PERCENT: 1.7 %
FOLATE: >20 NG/ML (ref 4.78–24.2)
GFR AFRICAN AMERICAN: >60
GFR NON-AFRICAN AMERICAN: >60
GLOBULIN: 2.2 G/DL
GLUCOSE BLD-MCNC: 233 MG/DL (ref 70–99)
HCT VFR BLD CALC: 42.5 % (ref 40.5–52.5)
HDLC SERPL-MCNC: 49 MG/DL (ref 40–60)
HEMOGLOBIN: 14.8 G/DL (ref 13.5–17.5)
HEPATITIS C ANTIBODY INTERPRETATION: NORMAL
LDL CHOLESTEROL CALCULATED: 58 MG/DL
LYMPHOCYTES ABSOLUTE: 2 K/UL (ref 1–5.1)
LYMPHOCYTES RELATIVE PERCENT: 22.6 %
MCH RBC QN AUTO: 32.6 PG (ref 26–34)
MCHC RBC AUTO-ENTMCNC: 34.7 G/DL (ref 31–36)
MCV RBC AUTO: 94 FL (ref 80–100)
MONOCYTES ABSOLUTE: 0.5 K/UL (ref 0–1.3)
MONOCYTES RELATIVE PERCENT: 5.7 %
NEUTROPHILS ABSOLUTE: 6.2 K/UL (ref 1.7–7.7)
NEUTROPHILS RELATIVE PERCENT: 69.3 %
PDW BLD-RTO: 12.9 % (ref 12.4–15.4)
PLATELET # BLD: 175 K/UL (ref 135–450)
PMV BLD AUTO: 9.2 FL (ref 5–10.5)
POTASSIUM SERPL-SCNC: 3.6 MMOL/L (ref 3.5–5.1)
PROSTATE SPECIFIC ANTIGEN: <0.01 NG/ML (ref 0–4)
RBC # BLD: 4.52 M/UL (ref 4.2–5.9)
SODIUM BLD-SCNC: 141 MMOL/L (ref 136–145)
TOTAL PROTEIN: 6.5 G/DL (ref 6.4–8.2)
TRIGL SERPL-MCNC: 75 MG/DL (ref 0–150)
TSH REFLEX: 3.08 UIU/ML (ref 0.27–4.2)
VITAMIN B-12: 1017 PG/ML (ref 211–911)
VLDLC SERPL CALC-MCNC: 15 MG/DL
WBC # BLD: 8.9 K/UL (ref 4–11)

## 2021-03-26 LAB
ESTIMATED AVERAGE GLUCOSE: 274.7 MG/DL
HBA1C MFR BLD: 11.2 %

## 2021-03-29 ENCOUNTER — OFFICE VISIT (OUTPATIENT)
Dept: FAMILY MEDICINE CLINIC | Age: 66
End: 2021-03-29
Payer: COMMERCIAL

## 2021-03-29 VITALS
SYSTOLIC BLOOD PRESSURE: 102 MMHG | DIASTOLIC BLOOD PRESSURE: 60 MMHG | BODY MASS INDEX: 33.24 KG/M2 | OXYGEN SATURATION: 96 % | WEIGHT: 229 LBS | TEMPERATURE: 97.1 F | HEART RATE: 76 BPM

## 2021-03-29 DIAGNOSIS — E11.9 TYPE 2 DIABETES MELLITUS WITHOUT COMPLICATION, WITHOUT LONG-TERM CURRENT USE OF INSULIN (HCC): Primary | ICD-10-CM

## 2021-03-29 DIAGNOSIS — E78.2 MIXED HYPERLIPIDEMIA: ICD-10-CM

## 2021-03-29 DIAGNOSIS — K22.70 BARRETT'S ESOPHAGUS WITHOUT DYSPLASIA: ICD-10-CM

## 2021-03-29 PROCEDURE — 99214 OFFICE O/P EST MOD 30 MIN: CPT | Performed by: FAMILY MEDICINE

## 2021-03-29 RX ORDER — VITAMIN B COMPLEX
1 CAPSULE ORAL DAILY
COMMUNITY

## 2021-03-29 RX ORDER — ATORVASTATIN CALCIUM 20 MG/1
20 TABLET, FILM COATED ORAL DAILY
Qty: 30 TABLET | Refills: 3 | Status: SHIPPED | OUTPATIENT
Start: 2021-03-29 | End: 2021-09-22

## 2021-03-29 ASSESSMENT — PATIENT HEALTH QUESTIONNAIRE - PHQ9
2. FEELING DOWN, DEPRESSED OR HOPELESS: 0
SUM OF ALL RESPONSES TO PHQ QUESTIONS 1-9: 0
1. LITTLE INTEREST OR PLEASURE IN DOING THINGS: 0
SUM OF ALL RESPONSES TO PHQ9 QUESTIONS 1 & 2: 0
SUM OF ALL RESPONSES TO PHQ QUESTIONS 1-9: 0

## 2021-03-29 NOTE — PROGRESS NOTES
Mayank Boothe is a 61 y.o. male    Chief Complaint   Patient presents with    Diabetes    Hyperlipidemia       HPI:    Diabetes  He presents for his follow-up diabetic visit. He has type 2 diabetes mellitus. His disease course has been worsening. Associated symptoms include polydipsia and polyuria. Symptoms are worsening. Risk factors for coronary artery disease include diabetes mellitus, male sex and obesity. When asked about current treatments, none were reported. An ACE inhibitor/angiotensin II receptor blocker is not being taken. Hyperlipidemia  This is a chronic problem. The current episode started more than 1 year ago. The problem is controlled. Recent lipid tests were reviewed and are normal. Exacerbating diseases include diabetes. Pertinent negatives include no myalgias. Current antihyperlipidemic treatment includes statins. The current treatment provides significant improvement of lipids. There are no compliance problems. Risk factors for coronary artery disease include diabetes mellitus, male sex and obesity. GERD. He has a history of Arevalo's esophagus. He is curious if it is okay to take the Prilosec 40 mg twice a day. ROS:    Review of Systems   Endocrine: Positive for polydipsia and polyuria. Musculoskeletal: Negative for myalgias. /60 (Site: Right Upper Arm, Position: Sitting, Cuff Size: Large Adult)   Pulse 76   Temp 97.1 °F (36.2 °C) (Temporal)   Wt 229 lb (103.9 kg)   SpO2 96%   BMI 33.24 kg/m²     Physical Exam:    Physical Exam  Constitutional:       General: He is not in acute distress. Appearance: Normal appearance. He is obese. He is not ill-appearing or toxic-appearing. HENT:      Head: Normocephalic. Neurological:      Mental Status: He is alert. Psychiatric:         Mood and Affect: Mood normal.         Behavior: Behavior normal.         Thought Content:  Thought content normal.         Current Outpatient Medications   Medication Sig Dispense Refill    b complex vitamins capsule Take 1 capsule by mouth daily      Cholecalciferol (VITAMIN D3) 125 MCG (5000 UT) TABS Take by mouth      metFORMIN (GLUCOPHAGE) 500 MG tablet Take 2 tablets by mouth 2 times daily (with meals) 120 tablet 5    atorvastatin (LIPITOR) 20 MG tablet Take 1 tablet by mouth daily 30 tablet 3    B Complex-C (SUPER B COMPLEX PO) Take by mouth daily      CALCIUM-VITAMIN D PO Take by mouth daily      folic acid (FOLVITE) 339 MCG tablet Take 400 mcg by mouth daily       Chromium Picolinate 500 MCG TABS Take 1 tablet by mouth daily.  albuterol sulfate  (90 Base) MCG/ACT inhaler Inhale 2 puffs into the lungs every 6 hours as needed for Wheezing or Shortness of Breath (Patient not taking: Reported on 3/29/2021) 1 Inhaler 3    gabapentin (NEURONTIN) 300 MG capsule Take 1 capsule by mouth 3 times daily for 30 days. (Patient not taking: Reported on 3/29/2021) 90 capsule 0    fluticasone (FLONASE) 50 MCG/ACT nasal spray 2 sprays by Each Nostril route daily 1 Bottle 2    gabapentin (NEURONTIN) 300 MG capsule Take 1 capsule by mouth 3 times daily for 60 days. (Patient not taking: Reported on 3/29/2021) 90 capsule 1    UNABLE TO FIND Take by mouth daily Reversitrol plus      Cinnamon 500 MG CAPS Take 2 capsules by mouth Daily        No current facility-administered medications for this visit. Assessment:    1. Type 2 diabetes mellitus without complication, without long-term current use of insulin ([address]    2. Mixed hyperlipidemia    3. Arevalo's esophagus without dysplasia        Plan:    1. Type 2 diabetes mellitus without complication, without long-term current use of insulin ([facility])  Surprisingly high A1c of 11.2. Start Metformin. Discussed the side effects. If still uncontrolled, would add on glipizide. - metFORMIN (GLUCOPHAGE) 500 MG tablet; Take 2 tablets by mouth 2 times daily (with meals)  Dispense: 120 tablet;  Refill: 5  - atorvastatin (LIPITOR) 20 MG tablet; Take 1 tablet by mouth daily  Dispense: 30 tablet; Refill: 3    2. Mixed hyperlipidemia  Stable. Continue Lipitor. 3. Arevalo's esophagus without dysplasia  Continue Prilosec. He is okay to go to 40 mg twice daily while having severe heartburn symptoms including a cough. Return in about 4 months (around 7/29/2021) for Diabetes, Hypertension, Hyperlipidemia.

## 2021-04-06 ENCOUNTER — TELEPHONE (OUTPATIENT)
Dept: FAMILY MEDICINE CLINIC | Age: 66
End: 2021-04-06

## 2021-04-06 NOTE — TELEPHONE ENCOUNTER
Patient states on 3/29 he started the metformin, he started off with one a day and then increased to twice a day because he didn't have issues with the medication. He states 3 days after taking the medication twice a day he has bene having diarrhea and dizziness and yesterday he even fell from the dizziness. He wants to know if he should only take it once a day or should he start a new medication.  Please Advise

## 2021-04-06 NOTE — TELEPHONE ENCOUNTER
Have his sugars looked better? The goal blood sugar is around 150. We can always switch to the extended release of the Metformin which is usually better tolerated. I would probably recommend staying with once a day of the Metformin and then slowly try to add on a second day if possible.

## 2021-04-07 NOTE — TELEPHONE ENCOUNTER
Talked to pt wife ([employer]) Angela Hooks, she stated pt was asleep. He works 3rd shift. Luckily when pt fell he was sitting down in a chair at work and they just sent him home. Slavanicholas Hooks stated pt only took 1 tablet yesterday (4/6/2021)  and nothing happened to pt. No dizziness nor diarrhea. Pt wife said let him try the Extended release tablet.       Pt can be reached after 4:30pm on phone number [phone] can be reached before 12:30pm and after 4:30 pm on phone number [phone]

## 2021-04-08 RX ORDER — METFORMIN HYDROCHLORIDE 500 MG/1
500 TABLET, EXTENDED RELEASE ORAL 2 TIMES DAILY WITH MEALS
Qty: 60 TABLET | Refills: 1 | Status: SHIPPED
Start: 2021-04-08 | End: 2021-08-23 | Stop reason: DRUGHIGH

## 2021-04-08 NOTE — TELEPHONE ENCOUNTER
Pt wife aware. She would like to keep both prescription on file as the other medication is helping and his sugars were in the 140's.

## 2021-04-21 DIAGNOSIS — R05.9 COUGH: ICD-10-CM

## 2021-04-21 RX ORDER — ALBUTEROL SULFATE 90 UG/1
AEROSOL, METERED RESPIRATORY (INHALATION)
Qty: 18 G | Refills: 2 | Status: SHIPPED | OUTPATIENT
Start: 2021-04-21

## 2021-08-23 ENCOUNTER — OFFICE VISIT (OUTPATIENT)
Dept: FAMILY MEDICINE CLINIC | Age: 66
End: 2021-08-23
Payer: COMMERCIAL

## 2021-08-23 ENCOUNTER — TELEPHONE (OUTPATIENT)
Dept: FAMILY MEDICINE CLINIC | Age: 66
End: 2021-08-23

## 2021-08-23 VITALS
SYSTOLIC BLOOD PRESSURE: 128 MMHG | HEART RATE: 81 BPM | OXYGEN SATURATION: 95 % | WEIGHT: 228 LBS | DIASTOLIC BLOOD PRESSURE: 60 MMHG | BODY MASS INDEX: 33.09 KG/M2

## 2021-08-23 DIAGNOSIS — K22.70 BARRETT'S ESOPHAGUS WITHOUT DYSPLASIA: ICD-10-CM

## 2021-08-23 DIAGNOSIS — E11.9 TYPE 2 DIABETES MELLITUS WITHOUT COMPLICATION, WITHOUT LONG-TERM CURRENT USE OF INSULIN (HCC): Primary | ICD-10-CM

## 2021-08-23 DIAGNOSIS — E78.2 MIXED HYPERLIPIDEMIA: ICD-10-CM

## 2021-08-23 LAB — HBA1C MFR BLD: 6 %

## 2021-08-23 PROCEDURE — 83036 HEMOGLOBIN GLYCOSYLATED A1C: CPT | Performed by: FAMILY MEDICINE

## 2021-08-23 PROCEDURE — 99214 OFFICE O/P EST MOD 30 MIN: CPT | Performed by: FAMILY MEDICINE

## 2021-08-23 RX ORDER — OMEPRAZOLE 40 MG/1
40 CAPSULE, DELAYED RELEASE ORAL
Qty: 90 CAPSULE | Refills: 1 | Status: SHIPPED | OUTPATIENT
Start: 2021-08-23 | End: 2022-01-27 | Stop reason: SDUPTHER

## 2021-08-23 SDOH — ECONOMIC STABILITY: FOOD INSECURITY: WITHIN THE PAST 12 MONTHS, YOU WORRIED THAT YOUR FOOD WOULD RUN OUT BEFORE YOU GOT MONEY TO BUY MORE.: NEVER TRUE

## 2021-08-23 SDOH — ECONOMIC STABILITY: FOOD INSECURITY: WITHIN THE PAST 12 MONTHS, THE FOOD YOU BOUGHT JUST DIDN'T LAST AND YOU DIDN'T HAVE MONEY TO GET MORE.: NEVER TRUE

## 2021-08-23 ASSESSMENT — SOCIAL DETERMINANTS OF HEALTH (SDOH): HOW HARD IS IT FOR YOU TO PAY FOR THE VERY BASICS LIKE FOOD, HOUSING, MEDICAL CARE, AND HEATING?: NOT HARD AT ALL

## 2021-08-23 NOTE — TELEPHONE ENCOUNTER
Pt is asking if he can switch providers to Dr. Celia Shepard. Is this ok ? If so , we need to schedule his next 6 month jenny .   Advise Pt

## 2021-08-23 NOTE — PROGRESS NOTES
Sherita Hebert is a 61 y.o. male    Chief Complaint   Patient presents with    Diabetes    Hyperlipidemia    Gastroesophageal Reflux       HPI:    Diabetes  He presents for his follow-up diabetic visit. He has type 2 diabetes mellitus. His disease course has been worsening. Associated symptoms include polydipsia and polyuria. Symptoms are worsening. Risk factors for coronary artery disease include diabetes mellitus, male sex and obesity. When asked about current treatments, none were reported. An ACE inhibitor/angiotensin II receptor blocker is not being taken. Hyperlipidemia  This is a chronic problem. The current episode started more than 1 year ago. The problem is controlled. Recent lipid tests were reviewed and are normal. Exacerbating diseases include diabetes. Pertinent negatives include no myalgias. Current antihyperlipidemic treatment includes statins. The current treatment provides significant improvement of lipids. There are no compliance problems. Risk factors for coronary artery disease include diabetes mellitus, male sex and obesity. GERD. He has a history of Arevalo's esophagus. He is curious if it is okay to take the Prilosec 40 mg twice a day. ROS:    Review of Systems   Endocrine: Positive for polydipsia and polyuria. Musculoskeletal: Negative for myalgias. /60 (Site: Right Upper Arm, Position: Sitting, Cuff Size: Large Adult)   Pulse 81   Wt 228 lb (103.4 kg)   SpO2 95%   BMI 33.09 kg/m²     Physical Exam:    Physical Exam  Constitutional:       General: He is not in acute distress. Appearance: Normal appearance. He is obese. He is not ill-appearing or toxic-appearing. HENT:      Head: Normocephalic. Neurological:      Mental Status: He is alert. Psychiatric:         Mood and Affect: Mood normal.         Behavior: Behavior normal.         Thought Content:  Thought content normal.         Current Outpatient Medications   Medication Sig Dispense Refill    omeprazole (PRILOSEC) 40 MG delayed release capsule Take 1 capsule by mouth every morning (before breakfast) 90 capsule 1    metFORMIN (GLUCOPHAGE) 500 MG tablet Take 2 tablets by mouth 2 times daily (with meals) 120 tablet 5    albuterol sulfate  (90 Base) MCG/ACT inhaler INHALE TWO PUFFS BY MOUTH EVERY 6 HOURS AS NEEDED FOR WHEEZING OR FOR SHORTNESS OF BREATH 18 g 2    b complex vitamins capsule Take 1 capsule by mouth daily      Cholecalciferol (VITAMIN D3) 125 MCG (5000 UT) TABS Take by mouth      atorvastatin (LIPITOR) 20 MG tablet Take 1 tablet by mouth daily 30 tablet 3    gabapentin (NEURONTIN) 300 MG capsule Take 1 capsule by mouth 3 times daily for 30 days. (Patient not taking: Reported on 3/29/2021) 90 capsule 0    fluticasone (FLONASE) 50 MCG/ACT nasal spray 2 sprays by Each Nostril route daily 1 Bottle 2    gabapentin (NEURONTIN) 300 MG capsule Take 1 capsule by mouth 3 times daily for 60 days. (Patient not taking: Reported on 3/29/2021) 90 capsule 1    B Complex-C (SUPER B COMPLEX PO) Take by mouth daily      CALCIUM-VITAMIN D PO Take by mouth daily      UNABLE TO FIND Take by mouth daily Reversitrol plus      folic acid (FOLVITE) 479 MCG tablet Take 400 mcg by mouth daily       Chromium Picolinate 500 MCG TABS Take 1 tablet by mouth daily.  Cinnamon 500 MG CAPS Take 2 capsules by mouth Daily        No current facility-administered medications for this visit. Assessment:    1. Type 2 diabetes mellitus without complication, without long-term current use of insulin ([address]    2. Mixed hyperlipidemia    3. Arevalo's esophagus without dysplasia        Plan:    1. Type 2 diabetes mellitus without complication, without long-term current use of insulin ([facility])  Stable. Continue current medications. 6.0  - metFORMIN (GLUCOPHAGE) 500 MG tablet; Take 2 tablets by mouth 2 times daily (with meals)  Dispense: 120 tablet; Refill: 5  - atorvastatin (LIPITOR) 20 MG tablet;  Take 1 tablet by mouth daily  Dispense: 30 tablet; Refill: 3    2. Mixed hyperlipidemia  Stable. Continue Lipitor. 3. Arevalo's esophagus without dysplasia  Continue Prilosec. I would recommend once a day and then discussing with GI the findings of his upcoming EGD. Return in about 6 months (around 2/23/2022) for Diabetes, Hyperlipidemia.

## 2021-08-24 ENCOUNTER — TELEPHONE (OUTPATIENT)
Dept: FAMILY MEDICINE CLINIC | Age: 66
End: 2021-08-24

## 2021-08-24 NOTE — TELEPHONE ENCOUNTER
LM informing pt both 's agreed he can switch to DR Fu's. He needs NTP with Dr. Leesa Ortiz and 6 months all in 1 visit.

## 2021-08-24 NOTE — TELEPHONE ENCOUNTER
ALLIM for pt to call our office back. To schedule his 6 months appt, with Dr. Maycol López as a \"new to provider\" visit.

## 2021-09-21 DIAGNOSIS — E11.9 TYPE 2 DIABETES MELLITUS WITHOUT COMPLICATION, WITHOUT LONG-TERM CURRENT USE OF INSULIN (HCC): ICD-10-CM

## 2021-09-21 NOTE — TELEPHONE ENCOUNTER
Refill Request     Last Seen: Last Seen Department: 8/23/2021  Last Seen by PCP: 8/23/2021    Last Written: 3/29/2021    Next Appointment:   No future appointments.         Requested Prescriptions     Pending Prescriptions Disp Refills    atorvastatin (LIPITOR) 20 MG tablet [Pharmacy Med Name: ATORVASTATIN 20 MG TABLET] 30 tablet 3     Sig: TAKE ONE TABLET BY MOUTH DAILY

## 2021-09-22 RX ORDER — ATORVASTATIN CALCIUM 20 MG/1
TABLET, FILM COATED ORAL
Qty: 30 TABLET | Refills: 3 | Status: SHIPPED | OUTPATIENT
Start: 2021-09-22 | End: 2022-01-27 | Stop reason: SDUPTHER

## 2021-09-27 ENCOUNTER — TELEPHONE (OUTPATIENT)
Dept: FAMILY MEDICINE CLINIC | Age: 66
End: 2021-09-27

## 2021-09-27 NOTE — TELEPHONE ENCOUNTER
----- Message from Benoit Mukherjee sent at 9/27/2021 12:06 PM EDT -----  Subject: Appointment Request    Reason for Call: Routine Physical Exam    QUESTIONS  Type of Appointment? Established Patient  Reason for appointment request? No appointments available during search  Additional Information for Provider? Patient would like to schedule a   physical appointment with Dr. Sai Brennan. Prefers Monday morning before the end   of the year.   ---------------------------------------------------------------------------  --------------  CALL BACK INFO  What is the best way for the office to contact you? OK to leave message on   voicemail  Preferred Call Back Phone Number? [phone]  ---------------------------------------------------------------------------  --------------  SCRIPT ANSWERS  Relationship to Patient? Other  Representative Name? Waqar Lujan  Additional information verified (besides Name and Date of Birth)? Address  (If the patient has Medicare as their primary insurance coverage ask this   question) Are you requesting a Medicare Annual Wellness Visit? No  (Is the patient requesting a pap smear with their physical exam?)? No  (Is the patient requesting their annual physical and does not need PAP or   AWV per above?)? Yes   Have you been diagnosed with, awaiting test results for, or told that you   are suspected of having COVID-19 (Coronavirus)? (If patient has tested   negative or was tested as a requirement for work, school, or travel and   not based on symptoms, answer no)? No  Within the past two weeks have you developed any of the following symptoms   (answer no if symptoms have been present longer than 2 weeks or began   more than 2 weeks ago)?  Fever or Chills, Cough, Shortness of breath or   difficulty breathing, Loss of taste or smell, Sore throat, Nasal   congestion, Sneezing or runny nose, Fatigue or generalized body aches   (answer no if pain is specific to a body part e.g. back pain), Diarrhea, Headache? No  Have you had close contact with someone with COVID-19 in the last 14 days? No  (Service Expert  click yes below to proceed with [employer] As Usual   Scheduling)?  Yes

## 2021-12-14 ENCOUNTER — TELEPHONE (OUTPATIENT)
Dept: FAMILY MEDICINE CLINIC | Age: 66
End: 2021-12-14

## 2022-01-06 ENCOUNTER — TELEPHONE (OUTPATIENT)
Dept: FAMILY MEDICINE CLINIC | Age: 67
End: 2022-01-06

## 2022-01-27 ENCOUNTER — OFFICE VISIT (OUTPATIENT)
Dept: FAMILY MEDICINE CLINIC | Age: 67
End: 2022-01-27
Payer: COMMERCIAL

## 2022-01-27 VITALS
OXYGEN SATURATION: 96 % | WEIGHT: 230 LBS | HEART RATE: 71 BPM | DIASTOLIC BLOOD PRESSURE: 72 MMHG | SYSTOLIC BLOOD PRESSURE: 134 MMHG | BODY MASS INDEX: 33.38 KG/M2

## 2022-01-27 DIAGNOSIS — E78.2 MIXED HYPERLIPIDEMIA: ICD-10-CM

## 2022-01-27 DIAGNOSIS — E55.9 VITAMIN D DEFICIENCY: ICD-10-CM

## 2022-01-27 DIAGNOSIS — Z00.00 PREVENTATIVE HEALTH CARE: Primary | ICD-10-CM

## 2022-01-27 DIAGNOSIS — E11.9 TYPE 2 DIABETES MELLITUS WITHOUT COMPLICATION, WITHOUT LONG-TERM CURRENT USE OF INSULIN (HCC): ICD-10-CM

## 2022-01-27 DIAGNOSIS — C61 PROSTATE CANCER (HCC): ICD-10-CM

## 2022-01-27 DIAGNOSIS — Z11.4 ENCOUNTER FOR SCREENING FOR HIV: ICD-10-CM

## 2022-01-27 DIAGNOSIS — K22.70 BARRETT'S ESOPHAGUS WITHOUT DYSPLASIA: ICD-10-CM

## 2022-01-27 LAB
A/G RATIO: 1.7 (ref 1.1–2.2)
ALBUMIN SERPL-MCNC: 4.6 G/DL (ref 3.4–5)
ALP BLD-CCNC: 81 U/L (ref 40–129)
ALT SERPL-CCNC: 35 U/L (ref 10–40)
ANION GAP SERPL CALCULATED.3IONS-SCNC: 19 MMOL/L (ref 3–16)
AST SERPL-CCNC: 26 U/L (ref 15–37)
BASOPHILS ABSOLUTE: 0.1 K/UL (ref 0–0.2)
BASOPHILS RELATIVE PERCENT: 1 %
BILIRUB SERPL-MCNC: 0.4 MG/DL (ref 0–1)
BUN BLDV-MCNC: 16 MG/DL (ref 7–20)
CALCIUM SERPL-MCNC: 9.6 MG/DL (ref 8.3–10.6)
CHLORIDE BLD-SCNC: 104 MMOL/L (ref 99–110)
CHOLESTEROL, TOTAL: 139 MG/DL (ref 0–199)
CO2: 19 MMOL/L (ref 21–32)
CREAT SERPL-MCNC: 0.9 MG/DL (ref 0.8–1.3)
EOSINOPHILS ABSOLUTE: 0.2 K/UL (ref 0–0.6)
EOSINOPHILS RELATIVE PERCENT: 3.2 %
FOLATE: 18.85 NG/ML (ref 4.78–24.2)
GFR AFRICAN AMERICAN: >60
GFR NON-AFRICAN AMERICAN: >60
GLUCOSE BLD-MCNC: 159 MG/DL (ref 70–99)
HBA1C MFR BLD: 6 %
HCT VFR BLD CALC: 46.4 % (ref 40.5–52.5)
HDLC SERPL-MCNC: 54 MG/DL (ref 40–60)
HEMOGLOBIN: 15.8 G/DL (ref 13.5–17.5)
LDL CHOLESTEROL CALCULATED: 73 MG/DL
LYMPHOCYTES ABSOLUTE: 1.6 K/UL (ref 1–5.1)
LYMPHOCYTES RELATIVE PERCENT: 24.5 %
MCH RBC QN AUTO: 32.4 PG (ref 26–34)
MCHC RBC AUTO-ENTMCNC: 34.1 G/DL (ref 31–36)
MCV RBC AUTO: 95.2 FL (ref 80–100)
MONOCYTES ABSOLUTE: 0.6 K/UL (ref 0–1.3)
MONOCYTES RELATIVE PERCENT: 9.2 %
NEUTROPHILS ABSOLUTE: 4 K/UL (ref 1.7–7.7)
NEUTROPHILS RELATIVE PERCENT: 62.1 %
PDW BLD-RTO: 12.6 % (ref 12.4–15.4)
PLATELET # BLD: 196 K/UL (ref 135–450)
PMV BLD AUTO: 8.6 FL (ref 5–10.5)
POTASSIUM SERPL-SCNC: 4.5 MMOL/L (ref 3.5–5.1)
PROSTATE SPECIFIC ANTIGEN: <0.01 NG/ML (ref 0–4)
RBC # BLD: 4.88 M/UL (ref 4.2–5.9)
SODIUM BLD-SCNC: 142 MMOL/L (ref 136–145)
TOTAL PROTEIN: 7.3 G/DL (ref 6.4–8.2)
TRIGL SERPL-MCNC: 62 MG/DL (ref 0–150)
TSH REFLEX: 1.74 UIU/ML (ref 0.27–4.2)
VITAMIN B-12: 508 PG/ML (ref 211–911)
VITAMIN D 25-HYDROXY: 77.9 NG/ML
VLDLC SERPL CALC-MCNC: 12 MG/DL
WBC # BLD: 6.5 K/UL (ref 4–11)

## 2022-01-27 PROCEDURE — 83036 HEMOGLOBIN GLYCOSYLATED A1C: CPT | Performed by: FAMILY MEDICINE

## 2022-01-27 PROCEDURE — 99397 PER PM REEVAL EST PAT 65+ YR: CPT | Performed by: FAMILY MEDICINE

## 2022-01-27 PROCEDURE — 82044 UR ALBUMIN SEMIQUANTITATIVE: CPT | Performed by: FAMILY MEDICINE

## 2022-01-27 RX ORDER — OMEPRAZOLE 40 MG/1
40 CAPSULE, DELAYED RELEASE ORAL
Qty: 180 CAPSULE | Refills: 1 | Status: SHIPPED | OUTPATIENT
Start: 2022-01-27

## 2022-01-27 RX ORDER — ATORVASTATIN CALCIUM 20 MG/1
TABLET, FILM COATED ORAL
Qty: 90 TABLET | Refills: 1 | Status: SHIPPED | OUTPATIENT
Start: 2022-01-27

## 2022-01-27 NOTE — PROGRESS NOTES
Johnny Martinez is a 74 y.o. male    Chief Complaint   Patient presents with    Annual Exam    Medication Problem     Prilosec dosage     Blood Work     PSA       HPI:    HPI     Preventative care. He needs to get colonoscopy and f/u EGD. Diabetes  He presents for his follow-up diabetic visit. He has type 2 diabetes mellitus. His disease course has been worsening. Associated symptoms include polydipsia and polyuria. Symptoms are worsening. Risk factors for coronary artery disease include diabetes mellitus, male sex and obesity. When asked about current treatments, none were reported. An ACE inhibitor/angiotensin II receptor blocker is not being taken. Hyperlipidemia  This is a chronic problem. The current episode started more than 1 year ago. The problem is controlled. Recent lipid tests were reviewed and are normal. Exacerbating diseases include diabetes. Pertinent negatives include no myalgias. Current antihyperlipidemic treatment includes statins. The current treatment provides significant improvement of lipids. There are no compliance problems. Risk factors for coronary artery disease include diabetes mellitus, male sex and obesity.      GERD. He has a history of Arevalo's esophagus. He is curious if it is okay to take the Prilosec 40 mg twice a day. He does get lots of heartburn. ROS:    Review of Systems   Gastrointestinal:        Heartburn       /72   Pulse 71   Wt 230 lb (104.3 kg)   SpO2 96%   BMI 33.38 kg/m²     Physical Exam:    Physical Exam  Constitutional:       General: He is not in acute distress. Appearance: Normal appearance. He is well-developed. He is obese. He is not ill-appearing, toxic-appearing or diaphoretic. HENT:      Head: Normocephalic. Cardiovascular:      Rate and Rhythm: Normal rate and regular rhythm. Pulses: Normal pulses. Heart sounds: No murmur heard. Pulmonary:      Effort: Pulmonary effort is normal. No respiratory distress. Breath sounds: Normal breath sounds. No wheezing. Musculoskeletal:      Cervical back: Normal range of motion. No rigidity. Lymphadenopathy:      Cervical: No cervical adenopathy. Neurological:      Mental Status: He is alert. Psychiatric:         Mood and Affect: Mood normal.         Behavior: Behavior normal.         Thought Content: Thought content normal.         Current Outpatient Medications   Medication Sig Dispense Refill    omeprazole (PRILOSEC) 40 MG delayed release capsule Take 1 capsule by mouth 2 times daily (before meals) 180 capsule 1    atorvastatin (LIPITOR) 20 MG tablet TAKE ONE TABLET BY MOUTH DAILY 90 tablet 1    metFORMIN (GLUCOPHAGE) 500 MG tablet Take 2 tablets by mouth 2 times daily (with meals) 360 tablet 1    albuterol sulfate  (90 Base) MCG/ACT inhaler INHALE TWO PUFFS BY MOUTH EVERY 6 HOURS AS NEEDED FOR WHEEZING OR FOR SHORTNESS OF BREATH 18 g 2    b complex vitamins capsule Take 1 capsule by mouth daily      Cholecalciferol (VITAMIN D3) 125 MCG (5000 UT) TABS Take by mouth      gabapentin (NEURONTIN) 300 MG capsule Take 1 capsule by mouth 3 times daily for 30 days. (Patient not taking: Reported on 3/29/2021) 90 capsule 0    fluticasone (FLONASE) 50 MCG/ACT nasal spray 2 sprays by Each Nostril route daily 1 Bottle 2    B Complex-C (SUPER B COMPLEX PO) Take by mouth daily      CALCIUM-VITAMIN D PO Take by mouth daily      UNABLE TO FIND Take by mouth daily Reversitrol plus      folic acid (FOLVITE) 355 MCG tablet Take 400 mcg by mouth daily       Chromium Picolinate 500 MCG TABS Take 1 tablet by mouth daily.  Cinnamon 500 MG CAPS Take 2 capsules by mouth Daily        No current facility-administered medications for this visit. Assessment:    1. Preventative health care    2. Type 2 diabetes mellitus without complication, without long-term current use of insulin ([address]    3. Mixed hyperlipidemia    4. Arevalo's esophagus without dysplasia    5. Vitamin D deficiency    6. Prostate cancer ([facility]        Plan:    1. Preventative health care    2. Type 2 diabetes mellitus without complication, without long-term current use of insulin ([facility])  Stable. Continue current medications. - POCT glycosylated hemoglobin (Hb A1C) 6.0  - POCT microalbumin  - atorvastatin (LIPITOR) 20 MG tablet; TAKE ONE TABLET BY MOUTH DAILY  Dispense: 90 tablet; Refill: 1  - metFORMIN (GLUCOPHAGE) 500 MG tablet; Take 2 tablets by mouth 2 times daily (with meals)  Dispense: 360 tablet; Refill: 1    3. Mixed hyperlipidemia  Stable. Continue current medications. - CBC Auto Differential; Future  - Comprehensive Metabolic Panel; Future  - Lipid Panel; Future  - Vitamin B12 & Folate; Future  - TSH with Reflex; Future    4. Arevalo's esophagus without dysplasia  Increase to BID use. - omeprazole (PRILOSEC) 40 MG delayed release capsule; Take 1 capsule by mouth 2 times daily (before meals)  Dispense: 180 capsule; Refill: 1    5. Vitamin D deficiency  - Vitamin D 25 Hydroxy; Future    6. Prostate cancer ([facility]  - PSA screening; Future      Return in about 6 months (around 7/27/2022) for Diabetes, Hypertension, Hyperlipidemia.

## 2022-01-28 LAB
HIV AG/AB: NORMAL
HIV ANTIGEN: NORMAL
HIV-1 ANTIBODY: NORMAL
HIV-2 AB: NORMAL

## 2022-04-05 ENCOUNTER — TELEPHONE (OUTPATIENT)
Dept: FAMILY MEDICINE CLINIC | Age: 67
End: 2022-04-05

## 2022-05-17 NOTE — TELEPHONE ENCOUNTER
Billing issues investigated and discussed with the patient's wife per HIPAA. She is referred to her insurance company. I have explained that the visit was preventative and coded accurately along with pt's labs d/t diagnoses. She is not convinced this is insurance requirement after much discussion. Advised this RN was unable to help further and that I would have Billing Customer Service call her and discuss further. Pt's wife agrees with plan. She is advised to call this RN back. Requested billing remove pt from collections.

## 2022-07-19 NOTE — TELEPHONE ENCOUNTER
Patients wife called asking for aydee conde. She will be available for the next 2 hours then she is going to work.

## 2022-07-20 ENCOUNTER — TELEPHONE (OUTPATIENT)
Dept: FAMILY MEDICINE CLINIC | Age: 67
End: 2022-07-20

## 2022-07-20 NOTE — TELEPHONE ENCOUNTER
My Note      1:49 PM   This is regards to husbands account. I have copied this encounter notes to ongoing patient call in correct chart. That message will be sent to [address] [place] [address], [facility] Licensed Nurse Signed     12:11 PM   Patient called the office, asking to speak with the  d/t billing Questions. Patient states she was supposed to receive a call between 10AM-12PM to discuss this. Please Advise patient Thanks     [address] Assistant Signed     8:22 AM   Hazel Hernandez, Please advise    [address] [facility] Assistant Signed     8:22 AM        ----- Message from Renea Hopper sent at 7/19/2022  2:16 PM EDT -----  Subject: Message to Provider     QUESTIONS  Information for Provider? pt requesting the  calls her back  07/20 between 10 am- 12 pm rather than today in regards to billing  ---------------------------------------------------------------------------  --------------  [address]  [phone]; Do not leave any message, patient will call back for answer  ---------------------------------------------------------------------------  --------------  SCRIPT ANSWERS  Relationship to Patient?  Self

## 2022-07-21 NOTE — TELEPHONE ENCOUNTER
Second attempt to reach patient's spouse - HIPAA  LM for her to return my call and contact info given.

## 2022-07-22 NOTE — TELEPHONE ENCOUNTER
Pt wife returns call. Explained that the patient has a deductible and that we are unable to code as preventative. Pt wife requests an itemized bill and to be taken out of collections. Advised that this RN would make sure this would happen.

## 2023-02-06 DIAGNOSIS — K22.70 BARRETT'S ESOPHAGUS WITHOUT DYSPLASIA: ICD-10-CM

## 2023-02-06 RX ORDER — OMEPRAZOLE 40 MG/1
CAPSULE, DELAYED RELEASE ORAL
Qty: 180 CAPSULE | Refills: 1 | Status: SHIPPED | OUTPATIENT
Start: 2023-02-06

## 2023-02-06 NOTE — TELEPHONE ENCOUNTER
Refill Request     CONFIRM preferrred pharmacy with the patient. If Mail Order Rx - Pend for 90 day refill. Last Seen: Last Seen Department: 1/27/2022  Last Seen by PCP: 1/27/2022    Last Written: 01/27/2022 180 capsule 1 refills    If no future appointment scheduled, route STAFF MESSAGE with patient name to the [facility] for scheduling. Next Appointment:   No future appointments. Message sent to [address] [place] [address] to schedule appt with patient?   N/A      Requested Prescriptions     Pending Prescriptions Disp Refills    omeprazole (PRILOSEC) 40 MG delayed release capsule [Pharmacy Med Name: OMEPRAZOLE DR 40 MG CAPSULE] 60 capsule      Sig: TAKE ONE CAPSULE BY MOUTH TWICE A DAY BEFORE MEALS

## 2023-02-13 ENCOUNTER — TELEPHONE (OUTPATIENT)
Dept: FAMILY MEDICINE CLINIC | Age: 68
End: 2023-02-13

## 2023-02-13 NOTE — TELEPHONE ENCOUNTER
----- Message from Nusrat Oliveros sent at 2/13/2023 10:20 AM EST -----  Subject: Appointment Request    Reason for Call: Established Patient Appointment needed: Routine Medicare   AWV    QUESTIONS    Reason for appointment request? Other - [facility] not allowed to book for this   provider     Additional Information for Provider? pt is needing physical  ---------------------------------------------------------------------------  --------------  CALL BACK INFO  [phone]; Do not leave any message, patient will call back for answer  ---------------------------------------------------------------------------  --------------  SCRIPT ANSWERS  COVID Screen: [place]   .

## 2023-02-13 NOTE — TELEPHONE ENCOUNTER
----- Message from Jh Parry sent at 2/13/2023 10:20 AM EST -----  Subject: Appointment Request    Reason for Call: Established Patient Appointment needed: Routine Medicare   AWV    QUESTIONS    Reason for appointment request? Other - [facility] not allowed to book for this   provider     Additional Information for Provider? pt is needing physical  ---------------------------------------------------------------------------  --------------  [address]  [phone];  Do not leave any message, patient will call back for answer  ---------------------------------------------------------------------------  --------------  SCRIPT ANSWERS  COVID Screen: Ally [place]

## 2023-02-13 NOTE — TELEPHONE ENCOUNTER
Error:   Patient does not have Medicare- I spoke with Yanet ( pt wife- Hipaa) she states that she called and he is needing a physical with Dr. Young and is having some issues with his feet and other things he would like to discuss. Asked that patient comes fasting to his appointment on February 21 st 2023 at 8:00 am.

## 2023-02-21 ENCOUNTER — OFFICE VISIT (OUTPATIENT)
Dept: FAMILY MEDICINE CLINIC | Age: 68
End: 2023-02-21

## 2023-02-21 VITALS
WEIGHT: 209 LBS | HEART RATE: 74 BPM | BODY MASS INDEX: 30.33 KG/M2 | OXYGEN SATURATION: 96 % | SYSTOLIC BLOOD PRESSURE: 134 MMHG | DIASTOLIC BLOOD PRESSURE: 72 MMHG

## 2023-02-21 DIAGNOSIS — Z00.00 PREVENTATIVE HEALTH CARE: Primary | ICD-10-CM

## 2023-02-21 DIAGNOSIS — K22.70 BARRETT'S ESOPHAGUS WITHOUT DYSPLASIA: ICD-10-CM

## 2023-02-21 DIAGNOSIS — E11.9 TYPE 2 DIABETES MELLITUS WITHOUT COMPLICATION, WITHOUT LONG-TERM CURRENT USE OF INSULIN (HCC): ICD-10-CM

## 2023-02-21 DIAGNOSIS — E78.2 MIXED HYPERLIPIDEMIA: ICD-10-CM

## 2023-02-21 LAB
CREATININE URINE POCT: 300
HBA1C MFR BLD: 5.5 %
MICROALBUMIN/CREAT 24H UR: 10 MG/G{CREAT}
MICROALBUMIN/CREAT UR-RTO: <30

## 2023-02-21 NOTE — PROGRESS NOTES
Shefali Wilson is a 79 y.o. male    Chief Complaint   Patient presents with    Annual Exam    Nail Problem     Right big toe, athlete foot         HPI:    HPI     Preventative care. He needs to get colonoscopy and f/u EGD. Diabetes  He presents for his follow-up diabetic visit. He has type 2 diabetes mellitus. His disease course has been stable. Associated symptoms include polydipsia and polyuria. Symptoms are stable. Risk factors for coronary artery disease include diabetes mellitus, male sex and obesity. When asked about current treatments, none were reported. An ACE inhibitor/angiotensin II receptor blocker is not being taken. Hyperlipidemia  This is a chronic problem. The current episode started more than 1 year ago. The problem is controlled. Recent lipid tests were reviewed and are normal. Exacerbating diseases include diabetes. Pertinent negatives include no myalgias. Current antihyperlipidemic treatment includes statins. The current treatment provides significant improvement of lipids. There are no compliance problems. Risk factors for coronary artery disease include diabetes mellitus, male sex and obesity. GERD. He has a history of Arevalo's esophagus. He is curious if it is okay to take the Prilosec 40 mg twice a day. He does get lots of heartburn. The 10-year ASCVD risk score (Jyoti DK, et al., 2019) is: 20.5%    Values used to calculate the score:      Age: 79 years      Sex: Male      Is Non- : No      Diabetic: Yes      Tobacco smoker: No      Systolic Blood Pressure: 592 mmHg      Is BP treated: No      HDL Cholesterol: 54 mg/dL      Total Cholesterol: 139 mg/dL    ROS:    Review of Systems   Gastrointestinal:         Heartburn     /72   Pulse 74   Wt 209 lb (94.8 kg)   SpO2 96%   BMI 30.33 kg/m²     Physical Exam:    Physical Exam  Constitutional:       General: He is not in acute distress. Appearance: Normal appearance. He is well-developed. He is obese. He is not ill-appearing, toxic-appearing or diaphoretic.   HENT:      Head: Normocephalic.   Cardiovascular:      Rate and Rhythm: Normal rate and regular rhythm.      Pulses: Normal pulses.      Heart sounds: No murmur heard.  Pulmonary:      Effort: Pulmonary effort is normal. No respiratory distress.      Breath sounds: Normal breath sounds. No wheezing.   Musculoskeletal:      Cervical back: Normal range of motion. No rigidity.   Lymphadenopathy:      Cervical: No cervical adenopathy.   Neurological:      Mental Status: He is alert.   Psychiatric:         Mood and Affect: Mood normal.         Behavior: Behavior normal.         Thought Content: Thought content normal.   Diabetic foot exam: sensation intact and equal bilaterally.     Current Outpatient Medications   Medication Sig Dispense Refill    metFORMIN (GLUCOPHAGE) 500 MG tablet Take 2 tablets by mouth 2 times daily (with meals) 360 tablet 1    Handicap Placard MISC by Does not apply route Length: 5 years 1 each 0    omeprazole (PRILOSEC) 40 MG delayed release capsule TAKE ONE CAPSULE BY MOUTH TWICE A DAY BEFORE MEALS 180 capsule 1    atorvastatin (LIPITOR) 20 MG tablet TAKE ONE TABLET BY MOUTH DAILY 90 tablet 1    albuterol sulfate  (90 Base) MCG/ACT inhaler INHALE TWO PUFFS BY MOUTH EVERY 6 HOURS AS NEEDED FOR WHEEZING OR FOR SHORTNESS OF BREATH 18 g 2    b complex vitamins capsule Take 1 capsule by mouth daily      Cholecalciferol (VITAMIN D3) 125 MCG (5000 UT) TABS Take by mouth      gabapentin (NEURONTIN) 300 MG capsule Take 1 capsule by mouth 3 times daily for 30 days. (Patient not taking: Reported on 3/29/2021) 90 capsule 0    fluticasone (FLONASE) 50 MCG/ACT nasal spray 2 sprays by Each Nostril route daily 1 Bottle 2    B Complex-C (SUPER B COMPLEX PO) Take by mouth daily      CALCIUM-VITAMIN D PO Take by mouth daily      UNABLE TO FIND Take by mouth daily Reversitrol plus      folic acid (FOLVITE) 800 MCG tablet Take 400 mcg by  mouth daily       Chromium Picolinate 500 MCG TABS Take 1 tablet by mouth daily. Cinnamon 500 MG CAPS Take 2 capsules by mouth Daily        No current facility-administered medications for this visit. Assessment:    1. Preventative health care    2. Type 2 diabetes mellitus without complication, without long-term current use of insulin ([address]    3. Mixed hyperlipidemia    4. Arevalo's esophagus without dysplasia          Plan:    1. Preventative health care    2. Type 2 diabetes mellitus without complication, without long-term current use of insulin ([facility])  Stable. Continue current medications. - POCT glycosylated hemoglobin (Hb A1C) 5.5  - POCT microalbumin  - atorvastatin (LIPITOR) 20 MG tablet; TAKE ONE TABLET BY MOUTH DAILY  Dispense: 90 tablet; Refill: 1  - metFORMIN (GLUCOPHAGE) 500 MG tablet; Take 2 tablets by mouth 2 times daily (with meals)  Dispense: 360 tablet; Refill: 1    3. Mixed hyperlipidemia  He is not sure about taking the Lipitor as his cholesterol labs were perfect. - CBC Auto Differential; Future  - Comprehensive Metabolic Panel; Future  - Lipid Panel; Future  - Vitamin B12 & Folate; Future  - TSH with Reflex; Future    4. Arevalo's esophagus without dysplasia  Stable. Continue current medications. - omeprazole (PRILOSEC) 40 MG delayed release capsule; Take 1 capsule by mouth 2 times daily (before meals)  Dispense: 180 capsule; Refill: 1    He did not want labs today. He has minimal tinea pedis present. Return in about 1 year (around 2/21/2024) for Preventative.

## 2023-04-28 NOTE — TELEPHONE ENCOUNTER
He can schedule a physical with me in any open 30-minute spot. If no availability he may have to see another provider. Vesicocolic fistula

## 2023-06-29 ENCOUNTER — TELEPHONE (OUTPATIENT)
Dept: FAMILY MEDICINE CLINIC | Age: 68
End: 2023-06-29

## 2023-07-21 LAB
ALBUMIN SERPL-MCNC: 4.5 G/DL (ref 3.4–5)
ALP SERPL-CCNC: 81 U/L (ref 40–129)
ALT SERPL-CCNC: 16 U/L (ref 10–40)
AST SERPL-CCNC: 14 U/L (ref 15–37)
BILIRUB DIRECT SERPL-MCNC: <0.2 MG/DL (ref 0–0.3)
BILIRUB INDIRECT SERPL-MCNC: ABNORMAL MG/DL (ref 0–1)
BILIRUB SERPL-MCNC: 0.3 MG/DL (ref 0–1)
PROT SERPL-MCNC: 6.5 G/DL (ref 6.4–8.2)

## 2023-08-14 DIAGNOSIS — K22.70 BARRETT'S ESOPHAGUS WITHOUT DYSPLASIA: ICD-10-CM

## 2023-08-14 RX ORDER — OMEPRAZOLE 40 MG/1
CAPSULE, DELAYED RELEASE ORAL
Qty: 60 CAPSULE | Refills: 5 | Status: SHIPPED | OUTPATIENT
Start: 2023-08-14

## 2023-08-14 NOTE — TELEPHONE ENCOUNTER
Refill Request     CONFIRM preferred pharmacy with the patient. If Mail Order Rx - Pend for 90 day refill. Last Seen: Last Seen Department: 2/21/2023  Last Seen by PCP: 2/21/2023    Last Written: 2/6/23 180 with ` refill     If no future appointment scheduled:  Review the last OV with PCP and review information for follow-up visit,  Route STAFF MESSAGE with patient name to the [facility] for scheduling with the following information:            -  Timing of next visit           -  Visit type ie Physical, OV, etc           -  Diagnoses/Reason ie. COPD, HTN - Do not use MEDICATION, Follow-up or CHECK UP - Give reason for visit      Next Appointment:   No future appointments. Message sent to [employer] [facility] to schedule appt with patient?   NO      Requested Prescriptions     Pending Prescriptions Disp Refills    omeprazole (PRILOSEC) 40 MG delayed release capsule [Pharmacy Med Name: OMEPRAZOLE DR 40 MG CAPSULE] 60 capsule      Sig: TAKE ONE CAPSULE BY MOUTH TWICE A DAY BEFORE MEALS

## 2024-02-13 ENCOUNTER — TELEPHONE (OUTPATIENT)
Dept: FAMILY MEDICINE CLINIC | Age: 69
End: 2024-02-13

## 2024-02-13 NOTE — TELEPHONE ENCOUNTER
----- Message from Esme Schilling sent at 2/13/2024 11:04 AM EST -----  Subject: Referral Request    Reason for referral request? patient would like labs order for physical on   2/29/24 since they will lose insurance on 3/4/24, needs A1C and urine   analysis and PSA, sees provider Hector  Provider patient wants to be referred to(if known):     Provider Phone Number(if known):    Additional Information for Provider?   ---------------------------------------------------------------------------  --------------  CALL BACK INFO    [phone]; OK to leave message on voicemail  ---------------------------------------------------------------------------  --------------

## 2024-02-13 NOTE — TELEPHONE ENCOUNTER
Called patient, wife answered the phone ( Yanet) on HIPAA. She asked if appointment could be moved up, she expressed that patient has had his appointment moved 3 different times and he will not have insurance on March 1st 2024 for the next couple of months. She was concerned for his health and lab work to be covered by insurance - so he can be seen in time.   Looked into getting him rescheduled sooner with Dr. Young    Patient is now seeing Dr. Young on February 16 th at 11:00 am I expressed to her that patient should come in fasting, NPO 8 - 10 hours beforehand, but only black coffee and water.   Yanet understood.     Patient will only get and physical and blood work- Patient is not on Medicare for a AWV to be covered.

## 2024-02-16 ENCOUNTER — OFFICE VISIT (OUTPATIENT)
Dept: FAMILY MEDICINE CLINIC | Age: 69
End: 2024-02-16
Payer: COMMERCIAL

## 2024-02-16 VITALS
HEART RATE: 82 BPM | SYSTOLIC BLOOD PRESSURE: 126 MMHG | OXYGEN SATURATION: 97 % | DIASTOLIC BLOOD PRESSURE: 72 MMHG | BODY MASS INDEX: 31.06 KG/M2 | WEIGHT: 214 LBS

## 2024-02-16 DIAGNOSIS — E55.9 VITAMIN D DEFICIENCY: ICD-10-CM

## 2024-02-16 DIAGNOSIS — K22.70 BARRETT'S ESOPHAGUS WITHOUT DYSPLASIA: ICD-10-CM

## 2024-02-16 DIAGNOSIS — C61 PROSTATE CANCER (HCC): ICD-10-CM

## 2024-02-16 DIAGNOSIS — E78.2 MIXED HYPERLIPIDEMIA: ICD-10-CM

## 2024-02-16 DIAGNOSIS — Z00.00 PREVENTATIVE HEALTH CARE: Primary | ICD-10-CM

## 2024-02-16 DIAGNOSIS — H93.A9 PULSATILE TINNITUS: ICD-10-CM

## 2024-02-16 DIAGNOSIS — E11.9 TYPE 2 DIABETES MELLITUS WITHOUT COMPLICATION, WITHOUT LONG-TERM CURRENT USE OF INSULIN (HCC): ICD-10-CM

## 2024-02-16 LAB
25(OH)D3 SERPL-MCNC: 101.9 NG/ML
ALBUMIN SERPL-MCNC: 4.5 G/DL (ref 3.4–5)
ALBUMIN/GLOB SERPL: 2 {RATIO} (ref 1.1–2.2)
ALP SERPL-CCNC: 74 U/L (ref 40–129)
ALT SERPL-CCNC: 18 U/L (ref 10–40)
ANION GAP SERPL CALCULATED.3IONS-SCNC: 12 MMOL/L (ref 3–16)
AST SERPL-CCNC: 13 U/L (ref 15–37)
BASOPHILS # BLD: 0 K/UL (ref 0–0.2)
BASOPHILS NFR BLD: 0.4 %
BILIRUB SERPL-MCNC: 0.4 MG/DL (ref 0–1)
BUN SERPL-MCNC: 15 MG/DL (ref 7–20)
CALCIUM SERPL-MCNC: 9.5 MG/DL (ref 8.3–10.6)
CHLORIDE SERPL-SCNC: 104 MMOL/L (ref 99–110)
CHOLEST SERPL-MCNC: 207 MG/DL (ref 0–199)
CO2 SERPL-SCNC: 24 MMOL/L (ref 21–32)
CREAT SERPL-MCNC: 0.8 MG/DL (ref 0.8–1.3)
CREATININE URINE POCT: 200
DEPRECATED RDW RBC AUTO: 12.7 % (ref 12.4–15.4)
EOSINOPHIL # BLD: 0.1 K/UL (ref 0–0.6)
EOSINOPHIL NFR BLD: 1.3 %
GFR SERPLBLD CREATININE-BSD FMLA CKD-EPI: >60 ML/MIN/{1.73_M2}
GLUCOSE SERPL-MCNC: 116 MG/DL (ref 70–99)
HBA1C MFR BLD: 8.1 %
HCT VFR BLD AUTO: 43.1 % (ref 40.5–52.5)
HDLC SERPL-MCNC: 73 MG/DL (ref 40–60)
HGB BLD-MCNC: 14.9 G/DL (ref 13.5–17.5)
LDLC SERPL CALC-MCNC: 120 MG/DL
LYMPHOCYTES # BLD: 1.7 K/UL (ref 1–5.1)
LYMPHOCYTES NFR BLD: 21.5 %
MCH RBC QN AUTO: 32.2 PG (ref 26–34)
MCHC RBC AUTO-ENTMCNC: 34.5 G/DL (ref 31–36)
MCV RBC AUTO: 93.4 FL (ref 80–100)
MICROALBUMIN/CREAT 24H UR: 10 MG/G{CREAT}
MICROALBUMIN/CREAT UR-RTO: <30
MONOCYTES # BLD: 0.6 K/UL (ref 0–1.3)
MONOCYTES NFR BLD: 7.5 %
NEUTROPHILS # BLD: 5.6 K/UL (ref 1.7–7.7)
NEUTROPHILS NFR BLD: 69.3 %
PLATELET # BLD AUTO: 176 K/UL (ref 135–450)
PMV BLD AUTO: 8.8 FL (ref 5–10.5)
POTASSIUM SERPL-SCNC: 3.9 MMOL/L (ref 3.5–5.1)
PROT SERPL-MCNC: 6.7 G/DL (ref 6.4–8.2)
PSA SERPL DL<=0.01 NG/ML-MCNC: <0.01 NG/ML (ref 0–4)
RBC # BLD AUTO: 4.62 M/UL (ref 4.2–5.9)
SODIUM SERPL-SCNC: 140 MMOL/L (ref 136–145)
TRIGL SERPL-MCNC: 69 MG/DL (ref 0–150)
TSH SERPL DL<=0.005 MIU/L-ACNC: 2.73 UIU/ML (ref 0.27–4.2)
VLDLC SERPL CALC-MCNC: 14 MG/DL
WBC # BLD AUTO: 8.1 K/UL (ref 4–11)

## 2024-02-16 PROCEDURE — 83036 HEMOGLOBIN GLYCOSYLATED A1C: CPT | Performed by: FAMILY MEDICINE

## 2024-02-16 PROCEDURE — 82044 UR ALBUMIN SEMIQUANTITATIVE: CPT | Performed by: FAMILY MEDICINE

## 2024-02-16 PROCEDURE — 99397 PER PM REEVAL EST PAT 65+ YR: CPT | Performed by: FAMILY MEDICINE

## 2024-02-16 RX ORDER — TIZANIDINE 2 MG/1
2 TABLET ORAL NIGHTLY PRN
Qty: 30 TABLET | Refills: 2 | Status: SHIPPED | OUTPATIENT
Start: 2024-02-16

## 2024-02-16 RX ORDER — ATORVASTATIN CALCIUM 20 MG/1
TABLET, FILM COATED ORAL
Qty: 90 TABLET | Refills: 1 | Status: SHIPPED | OUTPATIENT
Start: 2024-02-16

## 2024-02-16 NOTE — PROGRESS NOTES
Alejandro Oliveros is a 69 y.o. male    Chief Complaint   Patient presents with    Annual Exam    Hearing Problem     Ears are perfectly clean; Patient can not hear well especially out of the right ear.    Back Pain         HPI:     Preventative care.  He needs to get colonoscopy and f/u EGD.    Diabetes  He presents for his follow-up diabetic visit. He has type 2 diabetes mellitus. His disease course has been stable. Associated symptoms include polydipsia and polyuria. Symptoms are stable. Risk factors for coronary artery disease include diabetes mellitus, male sex and obesity. When asked about current treatments, none were reported. An ACE inhibitor/angiotensin II receptor blocker is not being taken.   Hyperlipidemia  This is a chronic problem. The current episode started more than 1 year ago. The problem is controlled. Recent lipid tests were reviewed and are normal. Exacerbating diseases include diabetes. Pertinent negatives include no myalgias. Current antihyperlipidemic treatment includes statins. The current treatment provides significant improvement of lipids. There are no compliance problems.  Risk factors for coronary artery disease include diabetes mellitus, male sex and obesity.      GERD.  He has a history of Arevalo's esophagus.  He is curious if it is okay to take the Prilosec 40 mg twice a day. He does get lots of heartburn.     The 10-year ASCVD risk score (Jyoti DK, et al., 2019) is: 20.2%    Values used to calculate the score:      Age: 69 years      Sex: Male      Is Non- : No      Diabetic: Yes      Tobacco smoker: No      Systolic Blood Pressure: 126 mmHg      Is BP treated: No      HDL Cholesterol: 54 mg/dL      Total Cholesterol: 139 mg/dL    ROS:    Review of Systems   Gastrointestinal:         Heartburn   Musculoskeletal:  Positive for back pain.       /72   Pulse 82   Wt 97.1 kg (214 lb)   SpO2 97%   BMI 31.06 kg/m²     Physical Exam:    Physical

## 2024-02-26 ENCOUNTER — OFFICE VISIT (OUTPATIENT)
Dept: ENT CLINIC | Age: 69
End: 2024-02-26
Payer: COMMERCIAL

## 2024-02-26 VITALS
BODY MASS INDEX: 31.7 KG/M2 | SYSTOLIC BLOOD PRESSURE: 118 MMHG | HEIGHT: 69 IN | HEART RATE: 80 BPM | WEIGHT: 214 LBS | DIASTOLIC BLOOD PRESSURE: 74 MMHG

## 2024-02-26 DIAGNOSIS — H90.3 ASNHL (ASYMMETRICAL SENSORINEURAL HEARING LOSS): Primary | ICD-10-CM

## 2024-02-26 DIAGNOSIS — H93.11 TINNITUS OF RIGHT EAR: ICD-10-CM

## 2024-02-26 PROCEDURE — 99203 OFFICE O/P NEW LOW 30 MIN: CPT | Performed by: STUDENT IN AN ORGANIZED HEALTH CARE EDUCATION/TRAINING PROGRAM

## 2024-02-26 PROCEDURE — 1123F ACP DISCUSS/DSCN MKR DOCD: CPT | Performed by: STUDENT IN AN ORGANIZED HEALTH CARE EDUCATION/TRAINING PROGRAM

## 2024-02-26 ASSESSMENT — ENCOUNTER SYMPTOMS
VOMITING: 0
COUGH: 0
NAUSEA: 0
RHINORRHEA: 0
SHORTNESS OF BREATH: 0
EYE PAIN: 0

## 2024-02-26 NOTE — PROGRESS NOTES
Negative for dizziness and headaches.      PHYSICAL EXAM  /74   Pulse 80   Ht 1.753 m (5' 9\")   Wt 97.1 kg (214 lb)   BMI 31.60 kg/m²   GENERAL: No Acute Distress, Alert and Oriented, no hoarseness  EYES: EOMI, Anti-icteric  NOSE: No epistaxis, nasal mucosa within normal limits, no purulent drainage  EARS: Normal external canal appearance, EAC patent bilaterally, TM's intact bilaterally with no evidence of effusions   FACE: 1/6 House-Brackmann Scale, symmetric, sensation equal bilaterally  ORAL CAVITY: No masses or lesions palpated, uvula is midline, moist mucous membranes,   NECK: Normal range of motion, no thyromegaly, trachea is midline, no lymphadenopathy, no neck masses, no crepitus  CHEST: Normal respiratory effort, no retractions, breathing comfortably  SKIN: No rashes, normal appearing skin, no evidence of skin lesions/tumors  RADIOLOGY  Summary of findings:    PROCEDURE    ASSESSMENT/PLAN  Alejandro is a very pleasant 69 y.o. male with     1. ASNHL (asymmetrical sensorineural hearing loss)  2. Tinnitus of right ear  Audiogram from July 2023 and MRI were reviewed.  The pulsatile tinnitus does not have any vascular nature on imaging.  He states that the hearing appears to have worsened on the right side.  Recommend repeat audiogram and obtaining hearing aids that are tuned to his hearing loss.  He will follow-up with a hearing test when it is convenient for him.    Cipriano Eldridge DO  2/26/2024    Medical Decision Making:  The following items were considered in medical decision making:  Independent review of images  Review / order clinical lab tests  Review / order radiology tests  Decision to obtain old records

## 2024-03-01 DIAGNOSIS — K22.70 BARRETT'S ESOPHAGUS WITHOUT DYSPLASIA: ICD-10-CM

## 2024-03-01 RX ORDER — OMEPRAZOLE 40 MG/1
CAPSULE, DELAYED RELEASE ORAL
Qty: 180 CAPSULE | Refills: 1 | Status: SHIPPED | OUTPATIENT
Start: 2024-03-01

## 2024-03-01 NOTE — TELEPHONE ENCOUNTER
Refill Request     CONFIRM preferred pharmacy with the patient.    If Mail Order Rx - Pend for 90 day refill.      Last Seen: Last Seen Department: 2/16/2024  Last Seen by PCP: 2/16/2024    Last Written: 8/14/2023    If no future appointment scheduled:  Review the last OV with PCP and review information for follow-up visit,  Route STAFF MESSAGE with patient name to the  Pool for scheduling with the following information:            -  Timing of next visit           -  Visit type ie Physical, OV, etc           -  Diagnoses/Reason ie. COPD, HTN - Do not use MEDICATION, Follow-up or CHECK UP - Give reason for visit      Next Appointment:   No future appointments.    Message sent to  to schedule appt with patient?  NO      Requested Prescriptions     Pending Prescriptions Disp Refills    omeprazole (PRILOSEC) 40 MG delayed release capsule [Pharmacy Med Name: OMEPRAZOLE DR 40 MG CAPSULE] 180 capsule 1     Sig: TAKE 1 CAPSULE BY MOUTH TWICE A DAY BEFORE MEALS

## 2024-03-26 ENCOUNTER — PATIENT MESSAGE (OUTPATIENT)
Dept: FAMILY MEDICINE CLINIC | Age: 69
End: 2024-03-26

## 2024-06-10 NOTE — TELEPHONE ENCOUNTER
EOB from insurance states expected balance at 10.42.    Entered document into chart.    Patient  msg sent.

## 2024-08-14 DIAGNOSIS — K22.70 BARRETT'S ESOPHAGUS WITHOUT DYSPLASIA: ICD-10-CM

## 2024-08-14 NOTE — TELEPHONE ENCOUNTER
Refill Request     CONFIRM preferred pharmacy with the patient.    If Mail Order Rx - Pend for 90 day refill.      Last Seen: Last Seen Department: 2/16/2024  Last Seen by PCP: 2/16/2024    Last Written: 3/1/24 #180 refills 1    If no future appointment scheduled:  Review the last OV with PCP and review information for follow-up visit,  Route STAFF MESSAGE with patient name to the  Pool for scheduling with the following information:            -  Timing of next visit           -  Visit type ie Physical, OV, etc           -  Diagnoses/Reason ie. COPD, HTN - Do not use MEDICATION, Follow-up or CHECK UP - Give reason for visit      Next Appointment:   No future appointments.    Message sent to  to schedule appt with patient?  YES  Return in about 1 year (around 2/16/2025) for Diabetes, Hypertension, Hyperlipidemia.     Requested Prescriptions     Pending Prescriptions Disp Refills    omeprazole (PRILOSEC) 40 MG delayed release capsule [Pharmacy Med Name: OMEPRAZOLE DR 40 MG CAPSULE] 180 capsule 1     Sig: TAKE 1 CAPSULE BY MOUTH TWICE A DAY BEFORE MEALS

## 2024-08-15 RX ORDER — OMEPRAZOLE 40 MG/1
CAPSULE, DELAYED RELEASE ORAL
Qty: 180 CAPSULE | Refills: 1 | Status: SHIPPED | OUTPATIENT
Start: 2024-08-15

## 2024-11-10 DIAGNOSIS — E11.9 TYPE 2 DIABETES MELLITUS WITHOUT COMPLICATION, WITHOUT LONG-TERM CURRENT USE OF INSULIN (HCC): ICD-10-CM

## 2024-11-10 NOTE — TELEPHONE ENCOUNTER
Refill Request     CONFIRM preferred pharmacy with the patient.    If Mail Order Rx - Pend for 90 day refill.      Last Seen: Last Seen Department: 2/16/2024  Last Seen by PCP: 2/16/2024    Last Written: 2/16/24 360 tab 1 refills    If no future appointment scheduled:  Review the last OV with PCP and review information for follow-up visit,  Route STAFF MESSAGE with patient name to the  Pool for scheduling with the following information:            -  Timing of next visit           -  Visit type ie Physical, OV, etc           -  Diagnoses/Reason ie. COPD, HTN - Do not use MEDICATION, Follow-up or CHECK UP - Give reason for visit      Next Appointment:   No future appointments.    Message sent to  to schedule appt with patient?  YES  Return in about 1 year (around 2/16/2025) for Diabetes, Hypertension, Hyperlipidemia.       Requested Prescriptions     Pending Prescriptions Disp Refills    metFORMIN (GLUCOPHAGE) 500 MG tablet [Pharmacy Med Name: METFORMIN  MG TABLET] 120 tablet      Sig: TAKE 2 TABLETS BY MOUTH TWICE A DAY WITH A MEAL

## 2024-11-14 NOTE — TELEPHONE ENCOUNTER
----- Message from Inna Mark sent at 1/6/2022  4:01 PM EST -----  Subject: Appointment Request    Reason for Call: Routine Physical Exam    QUESTIONS  Type of Appointment? Established Patient  Reason for appointment request? Available appointments did not meet   patient need  Additional Information for Provider? The patient is calling to schedule   his Annual physical. The patient needs a morning appointment if possible   due to work schedule. Please call the patient.  ---------------------------------------------------------------------------  --------------  CALL BACK INFO  What is the best way for the office to contact you? OK to leave message on   [facility], OK to respond with electronic message via [employer] portal (only   for patients who have registered [employer] account)  Preferred Call Back Phone Number? [phone]  ---------------------------------------------------------------------------  --------------  SCRIPT ANSWERS  Relationship to Patient? Self  (If the patient has Medicare as their primary insurance coverage ask this   question) Are you requesting a Medicare Annual Wellness Visit? No  (Is the patient requesting a pap smear with their physical exam?)? No  (Is the patient requesting their annual physical and does not need PAP or   AWV per above?)? Yes   Have you been diagnosed with, awaiting test results for, or told that you   are suspected of having COVID-19 (Coronavirus)? (If patient has tested   negative or was tested as a requirement for work, school, or travel and   not based on symptoms, answer no)? No  Within the past two weeks have you developed any of the following symptoms   (answer no if symptoms have been present longer than 2 weeks or began   more than 2 weeks ago)?  Fever or Chills, Cough, Shortness of breath or   difficulty breathing, Loss of taste or smell, Sore throat, Nasal   congestion, Sneezing or runny nose, Fatigue or generalized body aches   (answer no if pain is specific to a body part e.g. back pain), Diarrhea,   Headache? No  Have you had close contact with someone with COVID-19 in the last 14 days? No  (Service Expert  click yes below to proceed with [employer] As Usual   Scheduling)?  Yes Assessment performed

## 2024-12-13 DIAGNOSIS — E11.9 TYPE 2 DIABETES MELLITUS WITHOUT COMPLICATION, WITHOUT LONG-TERM CURRENT USE OF INSULIN (HCC): ICD-10-CM

## 2024-12-13 RX ORDER — ATORVASTATIN CALCIUM 20 MG/1
TABLET, FILM COATED ORAL
Qty: 90 TABLET | Refills: 1 | Status: SHIPPED | OUTPATIENT
Start: 2024-12-13

## 2024-12-13 NOTE — TELEPHONE ENCOUNTER
Refill Request     CONFIRM preferred pharmacy with the patient.    If Mail Order Rx - Pend for 90 day refill.      Last Seen: Last Seen Department: 2/16/2024  Last Seen by PCP: Visit date not found    Last Written: 2/16/2024    If no future appointment scheduled:  Review the last OV with PCP and review information for follow-up visit,  Route STAFF MESSAGE with patient name to the  Pool for scheduling with the following information:            -  Timing of next visit           -  Visit type ie Physical, OV, etc           -  Diagnoses/Reason ie. COPD, HTN - Do not use MEDICATION, Follow-up or CHECK UP - Give reason for visit      Next Appointment:   Future Appointments   Date Time Provider Department Center   2/17/2025  9:00 AM Jimmy Young DO [facility] DE[facility]       Message sent to  to schedule appt with patient?  NO      Requested Prescriptions     Pending Prescriptions Disp Refills    atorvastatin (LIPITOR) 20 MG tablet 90 tablet 1     Sig: TAKE ONE TABLET BY MOUTH DAILY       
Non- : No      Diabetic: Yes      Tobacco smoker: No      Systolic Blood Pressure: 118 mmHg      Is BP treated: No      HDL Cholesterol: 73 mg/dL      Total Cholesterol: 207 mg/dL           STATIN GAP IDENTIFIED    Per chart review, statin therapy is recommended as per the  ADA Guidelines:  For primary prevention:     Patients with diabetes (age 40-75) without ASCVD: moderate-intensity statin is recommended.   Patients with diabetes (age 40-75) at higher cardiovascular risk (including patients with one or more ASCVD risk factors:  duration of diabetes, obesity/overweight, family history of premature ASCVD, hypertension, dyslipidemia, smoking, or CKD/albuminuria): high-intensity statin is recommended.  It is recommended to reduce LDL >/=50% from baseline and to a target LDL goal <70.      Per chart review, a statin is prescribed for the patient; however, the patient has not refilled and picked up the medication in .  Patient is prescribed atorvastatin 20 mg daily; current prescription has not been refilled and is now  (as not filled within 6 months of issued date).        PLAN  Interventions that have been identified include:  - Patient overdue refilling atorvastatin and active on home medication list.   - Patient needs refills for atorvastatin.  - Patient identified as having SUPD gap  Will pend prescription for provider review.      Future Appointments   Date Time Provider Department Center   2025  9:00 AM Jimmy Young DO [facility] DEP     Jyotsna Rangel, PharmD, [facility]  Population Health Pharmacist  [facility]  Department, toll free: [phone], option 1

## 2025-01-31 DIAGNOSIS — K22.70 BARRETT'S ESOPHAGUS WITHOUT DYSPLASIA: ICD-10-CM

## 2025-02-01 NOTE — TELEPHONE ENCOUNTER
.Refill Request     CONFIRM preferred pharmacy with the patient.    If Mail Order Rx - Pend for 90 day refill.      Last Seen: Last Seen Department: 2/16/2024  Last Seen by PCP: 2/16/2024    Last Written: 8/15/24 180 with 1     If no future appointment scheduled:  Review the last OV with PCP and review information for follow-up visit,  Route STAFF MESSAGE with patient name to the  Pool for scheduling with the following information:            -  Timing of next visit           -  Visit type ie Physical, OV, etc           -  Diagnoses/Reason ie. COPD, HTN - Do not use MEDICATION, Follow-up or CHECK UP - Give reason for visit      Next Appointment:   Future Appointments   Date Time Provider Department Center   4/28/2025  3:30 PM Jimmy Young DO [facility] DEP       Message sent to  to schedule appt with patient?  NO      Requested Prescriptions     Pending Prescriptions Disp Refills    omeprazole (PRILOSEC) 40 MG delayed release capsule [Pharmacy Med Name: OMEPRAZOLE DR 40 MG CAPSULE] 180 capsule 1     Sig: TAKE 1 CAPSULE BY MOUTH TWICE A DAY BEFORE MEALS

## 2025-02-03 RX ORDER — OMEPRAZOLE 40 MG/1
CAPSULE, DELAYED RELEASE ORAL
Qty: 180 CAPSULE | Refills: 1 | Status: SHIPPED | OUTPATIENT
Start: 2025-02-03

## 2025-02-04 DIAGNOSIS — E11.9 TYPE 2 DIABETES MELLITUS WITHOUT COMPLICATION, WITHOUT LONG-TERM CURRENT USE OF INSULIN (HCC): ICD-10-CM

## 2025-02-04 NOTE — TELEPHONE ENCOUNTER
Refill Request     CONFIRM preferred pharmacy with the patient.    If Mail Order Rx - Pend for 90 day refill.      Last Seen: Last Seen Department: 2/16/2024  Last Seen by PCP: 2/16/2024    Last Written: 11/12/2024 360 tab 1 refills     If no future appointment scheduled:  Review the last OV with PCP and review information for follow-up visit,  Route STAFF MESSAGE with patient name to the  Pool for scheduling with the following information:            -  Timing of next visit           -  Visit type ie Physical, OV, etc           -  Diagnoses/Reason ie. COPD, HTN - Do not use MEDICATION, Follow-up or CHECK UP - Give reason for visit      Next Appointment:   Future Appointments   Date Time Provider Department Center   4/28/2025  3:30 PM Jimmy Young DO [facility]       Message sent to  to schedule appt with patient?  NO      Requested Prescriptions     Pending Prescriptions Disp Refills    metFORMIN (GLUCOPHAGE) 500 MG tablet [Pharmacy Med Name: METFORMIN  MG TABLET] 360 tablet 1     Sig: TAKE 2 TABLETS BY MOUTH TWICE A DAY WITH A MEAL

## 2025-02-06 ENCOUNTER — TELEPHONE (OUTPATIENT)
Dept: FAMILY MEDICINE CLINIC | Age: 70
End: 2025-02-06

## 2025-02-06 DIAGNOSIS — E78.2 MIXED HYPERLIPIDEMIA: ICD-10-CM

## 2025-02-06 DIAGNOSIS — E55.9 VITAMIN D DEFICIENCY: ICD-10-CM

## 2025-02-06 DIAGNOSIS — E11.9 TYPE 2 DIABETES MELLITUS WITHOUT COMPLICATION, WITHOUT LONG-TERM CURRENT USE OF INSULIN (HCC): Primary | ICD-10-CM

## 2025-02-06 DIAGNOSIS — Z12.5 SCREENING PSA (PROSTATE SPECIFIC ANTIGEN): ICD-10-CM

## 2025-02-06 NOTE — TELEPHONE ENCOUNTER
Patient calling asking if provider can please put yearly lab orders in so he can get these done before his appointment. He has been fasting so far this morning and would like to do this today.    Please advise  Thank you

## 2025-02-07 ENCOUNTER — OFFICE VISIT (OUTPATIENT)
Dept: FAMILY MEDICINE CLINIC | Age: 70
End: 2025-02-07

## 2025-02-07 VITALS
DIASTOLIC BLOOD PRESSURE: 64 MMHG | HEIGHT: 69 IN | SYSTOLIC BLOOD PRESSURE: 130 MMHG | BODY MASS INDEX: 35.55 KG/M2 | HEART RATE: 82 BPM | OXYGEN SATURATION: 97 % | WEIGHT: 240 LBS

## 2025-02-07 DIAGNOSIS — E66.01 MORBID (SEVERE) OBESITY DUE TO EXCESS CALORIES: ICD-10-CM

## 2025-02-07 DIAGNOSIS — E11.65 TYPE 2 DIABETES MELLITUS WITH HYPERGLYCEMIA, WITHOUT LONG-TERM CURRENT USE OF INSULIN (HCC): ICD-10-CM

## 2025-02-07 DIAGNOSIS — E78.2 MIXED HYPERLIPIDEMIA: ICD-10-CM

## 2025-02-07 DIAGNOSIS — R05.9 COUGH: ICD-10-CM

## 2025-02-07 DIAGNOSIS — Z00.00 WELCOME TO MEDICARE PREVENTIVE VISIT: Primary | ICD-10-CM

## 2025-02-07 DIAGNOSIS — E55.9 VITAMIN D DEFICIENCY: ICD-10-CM

## 2025-02-07 DIAGNOSIS — E11.9 TYPE 2 DIABETES MELLITUS WITHOUT COMPLICATION, WITHOUT LONG-TERM CURRENT USE OF INSULIN (HCC): ICD-10-CM

## 2025-02-07 DIAGNOSIS — K22.70 BARRETT'S ESOPHAGUS WITHOUT DYSPLASIA: ICD-10-CM

## 2025-02-07 DIAGNOSIS — C61 PROSTATE CANCER (HCC): ICD-10-CM

## 2025-02-07 DIAGNOSIS — Z12.5 SCREENING PSA (PROSTATE SPECIFIC ANTIGEN): ICD-10-CM

## 2025-02-07 LAB
25(OH)D3 SERPL-MCNC: 49.8 NG/ML
ALBUMIN SERPL-MCNC: 4.3 G/DL (ref 3.4–5)
ALBUMIN/GLOB SERPL: 2.2 {RATIO} (ref 1.1–2.2)
ALP SERPL-CCNC: 81 U/L (ref 40–129)
ALT SERPL-CCNC: 22 U/L (ref 10–40)
ANION GAP SERPL CALCULATED.3IONS-SCNC: 10 MMOL/L (ref 3–16)
AST SERPL-CCNC: 14 U/L (ref 15–37)
BASOPHILS # BLD: 0.1 K/UL (ref 0–0.2)
BASOPHILS NFR BLD: 0.7 %
BILIRUB SERPL-MCNC: 0.3 MG/DL (ref 0–1)
BUN SERPL-MCNC: 14 MG/DL (ref 7–20)
CALCIUM SERPL-MCNC: 8.8 MG/DL (ref 8.3–10.6)
CHLORIDE SERPL-SCNC: 105 MMOL/L (ref 99–110)
CHOLEST SERPL-MCNC: 169 MG/DL (ref 0–199)
CO2 SERPL-SCNC: 26 MMOL/L (ref 21–32)
CREAT SERPL-MCNC: 0.9 MG/DL (ref 0.8–1.3)
DEPRECATED RDW RBC AUTO: 13.1 % (ref 12.4–15.4)
EOSINOPHIL # BLD: 0.2 K/UL (ref 0–0.6)
EOSINOPHIL NFR BLD: 1.9 %
EST. AVERAGE GLUCOSE BLD GHB EST-MCNC: 139.9 MG/DL
GFR SERPLBLD CREATININE-BSD FMLA CKD-EPI: >90 ML/MIN/{1.73_M2}
GLUCOSE SERPL-MCNC: 123 MG/DL (ref 70–99)
HBA1C MFR BLD: 6.5 %
HCT VFR BLD AUTO: 43.9 % (ref 40.5–52.5)
HDLC SERPL-MCNC: 56 MG/DL (ref 40–60)
HGB BLD-MCNC: 14.9 G/DL (ref 13.5–17.5)
LDLC SERPL CALC-MCNC: 103 MG/DL
LYMPHOCYTES # BLD: 1.7 K/UL (ref 1–5.1)
LYMPHOCYTES NFR BLD: 20.1 %
MCH RBC QN AUTO: 31.6 PG (ref 26–34)
MCHC RBC AUTO-ENTMCNC: 33.9 G/DL (ref 31–36)
MCV RBC AUTO: 93.1 FL (ref 80–100)
MONOCYTES # BLD: 0.7 K/UL (ref 0–1.3)
MONOCYTES NFR BLD: 7.8 %
NEUTROPHILS # BLD: 5.9 K/UL (ref 1.7–7.7)
NEUTROPHILS NFR BLD: 69.5 %
PLATELET # BLD AUTO: 192 K/UL (ref 135–450)
PMV BLD AUTO: 9 FL (ref 5–10.5)
POTASSIUM SERPL-SCNC: 4 MMOL/L (ref 3.5–5.1)
PROT SERPL-MCNC: 6.3 G/DL (ref 6.4–8.2)
PSA SERPL DL<=0.01 NG/ML-MCNC: <0.02 NG/ML (ref 0–4)
RBC # BLD AUTO: 4.71 M/UL (ref 4.2–5.9)
SODIUM SERPL-SCNC: 141 MMOL/L (ref 136–145)
TRIGL SERPL-MCNC: 48 MG/DL (ref 0–150)
TSH SERPL DL<=0.005 MIU/L-ACNC: 2.5 UIU/ML (ref 0.27–4.2)
VLDLC SERPL CALC-MCNC: 10 MG/DL
WBC # BLD AUTO: 8.5 K/UL (ref 4–11)

## 2025-02-07 RX ORDER — ALBUTEROL SULFATE 90 UG/1
2 INHALANT RESPIRATORY (INHALATION) EVERY 6 HOURS PRN
Qty: 18 G | Refills: 3 | Status: SHIPPED | OUTPATIENT
Start: 2025-02-07

## 2025-02-07 RX ORDER — METFORMIN HYDROCHLORIDE 500 MG/1
1000 TABLET, EXTENDED RELEASE ORAL 2 TIMES DAILY WITH MEALS
Qty: 120 TABLET | Refills: 5 | Status: SHIPPED | OUTPATIENT
Start: 2025-02-07

## 2025-02-07 RX ORDER — PSEUDOEPHEDRINE HCL 30 MG/1
30 TABLET, FILM COATED ORAL EVERY 4 HOURS PRN
COMMUNITY

## 2025-02-07 RX ORDER — ALBUTEROL SULFATE 90 UG/1
2 INHALANT RESPIRATORY (INHALATION) EVERY 6 HOURS PRN
Qty: 18 G | Refills: 2 | Status: CANCELLED | OUTPATIENT
Start: 2025-02-07

## 2025-02-07 SDOH — ECONOMIC STABILITY: FOOD INSECURITY: WITHIN THE PAST 12 MONTHS, YOU WORRIED THAT YOUR FOOD WOULD RUN OUT BEFORE YOU GOT MONEY TO BUY MORE.: NEVER TRUE

## 2025-02-07 SDOH — ECONOMIC STABILITY: FOOD INSECURITY: WITHIN THE PAST 12 MONTHS, THE FOOD YOU BOUGHT JUST DIDN'T LAST AND YOU DIDN'T HAVE MONEY TO GET MORE.: NEVER TRUE

## 2025-02-07 ASSESSMENT — PATIENT HEALTH QUESTIONNAIRE - PHQ9
7. TROUBLE CONCENTRATING ON THINGS, SUCH AS READING THE NEWSPAPER OR WATCHING TELEVISION: NOT AT ALL
4. FEELING TIRED OR HAVING LITTLE ENERGY: MORE THAN HALF THE DAYS
SUM OF ALL RESPONSES TO PHQ QUESTIONS 1-9: 2
SUM OF ALL RESPONSES TO PHQ QUESTIONS 1-9: 2
5. POOR APPETITE OR OVEREATING: NOT AT ALL
SUM OF ALL RESPONSES TO PHQ9 QUESTIONS 1 & 2: 0
3. TROUBLE FALLING OR STAYING ASLEEP: NOT AT ALL
10. IF YOU CHECKED OFF ANY PROBLEMS, HOW DIFFICULT HAVE THESE PROBLEMS MADE IT FOR YOU TO DO YOUR WORK, TAKE CARE OF THINGS AT HOME, OR GET ALONG WITH OTHER PEOPLE: NOT DIFFICULT AT ALL
6. FEELING BAD ABOUT YOURSELF - OR THAT YOU ARE A FAILURE OR HAVE LET YOURSELF OR YOUR FAMILY DOWN: NOT AT ALL
1. LITTLE INTEREST OR PLEASURE IN DOING THINGS: NOT AT ALL
9. THOUGHTS THAT YOU WOULD BE BETTER OFF DEAD, OR OF HURTING YOURSELF: NOT AT ALL
SUM OF ALL RESPONSES TO PHQ QUESTIONS 1-9: 2
2. FEELING DOWN, DEPRESSED OR HOPELESS: NOT AT ALL
SUM OF ALL RESPONSES TO PHQ QUESTIONS 1-9: 2
8. MOVING OR SPEAKING SO SLOWLY THAT OTHER PEOPLE COULD HAVE NOTICED. OR THE OPPOSITE, BEING SO FIGETY OR RESTLESS THAT YOU HAVE BEEN MOVING AROUND A LOT MORE THAN USUAL: NOT AT ALL

## 2025-02-07 ASSESSMENT — LIFESTYLE VARIABLES
HOW MANY STANDARD DRINKS CONTAINING ALCOHOL DO YOU HAVE ON A TYPICAL DAY: 1 OR 2
HOW OFTEN DO YOU HAVE A DRINK CONTAINING ALCOHOL: 2-4 TIMES A MONTH

## 2025-02-07 NOTE — TELEPHONE ENCOUNTER
Patient wanted to verify that the extended release metformin and inhaler refill were being sent in soon. Please advise, thank you.

## 2025-02-07 NOTE — PROGRESS NOTES
Alejandro Oliveros is a 70 y.o. male    Chief Complaint   Patient presents with    Medicare AWV     Dog allergies    Medication Refill    Diabetes    Hyperlipidemia       HPI:    Diabetes  He presents for his follow-up diabetic visit. He has type 2 diabetes mellitus. His disease course has been worsening. Associated symptoms include polydipsia and polyuria. Symptoms are worsening. Risk factors for coronary artery disease include diabetes mellitus, male sex and obesity. When asked about current treatments, none were reported. An ACE inhibitor/angiotensin II receptor blocker is not being taken.   Hyperlipidemia  This is a chronic problem. The current episode started more than 1 year ago. The problem is controlled. Recent lipid tests were reviewed and are normal. Exacerbating diseases include diabetes. Current antihyperlipidemic treatment includes statins. The current treatment provides significant improvement of lipids. There are no compliance problems.  Risk factors for coronary artery disease include diabetes mellitus, male sex and obesity.     GERD.  He has a history of Arevalo's esophagus.  He is curious if it is okay to take the Prilosec 40 mg twice a day.    ROS:    Review of Systems   Endocrine: Positive for polydipsia and polyuria.       /64 (Site: Right Upper Arm, Position: Sitting, Cuff Size: Large Adult)   Pulse 82   Ht 1.753 m (5' 9.02\")   Wt 108.9 kg (240 lb)   SpO2 97%   BMI 35.43 kg/m²     Physical Exam:    Physical Exam  Constitutional:       General: He is not in acute distress.     Appearance: Normal appearance. He is obese. He is not ill-appearing or toxic-appearing.   HENT:      Head: Normocephalic.   Neurological:      Mental Status: He is alert.   Psychiatric:         Mood and Affect: Mood normal.         Behavior: Behavior normal.         Thought Content: Thought content normal.         Current Outpatient Medications   Medication Sig Dispense Refill    pseudoephedrine (SUDAFED) 30 MG

## 2025-02-07 NOTE — PATIENT INSTRUCTIONS
preventive health benefits. Some of the tests and screenings are paid in full while other may be subject to a deductible, co-insurance, and/or copay.  Some of these benefits include a comprehensive review of your medical history including lifestyle, illnesses that may run in your family, and various assessments and screenings as appropriate.  After reviewing your medical record and screening and assessments performed today your provider may have ordered immunizations, labs, imaging, and/or referrals for you.  A list of these orders (if applicable) as well as your Preventive Care list are included within your After Visit Summary for your review.

## 2025-02-08 LAB
CREAT UR-MCNC: 185 MG/DL (ref 39–259)
MICROALBUMIN UR DL<=1MG/L-MCNC: <1.2 MG/DL
MICROALBUMIN/CREAT UR: NORMAL MG/G (ref 0–30)

## 2025-02-25 ENCOUNTER — TELEPHONE (OUTPATIENT)
Dept: FAMILY MEDICINE CLINIC | Age: 70
End: 2025-02-25

## 2025-02-25 NOTE — TELEPHONE ENCOUNTER
Patient had CT Scan today and was told to stop Metformin for 48 hours afterwards.  Patient wants to verify if he just continues Metformin after 48 hours or what instructions he needs to follow.  Attached a form from imaging.

## 2025-02-25 NOTE — TELEPHONE ENCOUNTER
Typically if he was given contrast with a CT he needs to hold the metformin for 48 hours afterwards.  He would just resume it 48 hours after the CT scan.  He should work on a low carbohydrate diet up until the time he will start the metformin in case his blood sugars do rise.  Drink lots of water.

## 2025-02-25 NOTE — TELEPHONE ENCOUNTER
Surendra Young, please advise on message below. Thanks!        Lillian Harris9 minutes ago (8:29 AM)     SR  Patient had CT Scan today and was told to stop Metformin for 48 hours afterwards.  Patient wants to verify if he just continues Metformin after 48 hours or what instructions he needs to follow.  Attached a form from imaging.

## 2025-07-11 DIAGNOSIS — E11.65 TYPE 2 DIABETES MELLITUS WITH HYPERGLYCEMIA, WITHOUT LONG-TERM CURRENT USE OF INSULIN (HCC): ICD-10-CM

## 2025-07-14 RX ORDER — METFORMIN HYDROCHLORIDE 500 MG/1
TABLET, EXTENDED RELEASE ORAL
Qty: 360 TABLET | Refills: 1 | Status: SHIPPED | OUTPATIENT
Start: 2025-07-14

## 2025-07-14 NOTE — TELEPHONE ENCOUNTER
.Refill Request     CONFIRM preferred pharmacy with the patient.    If Mail Order Rx - Pend for 90 day refill.      Last Seen: Last Seen Department: 2/7/2025  Last Seen by PCP: 2/7/2025    Last Written: 2-7-25 120 with 5     If no future appointment scheduled:  Review the last OV with PCP and review information for follow-up visit,  Route STAFF MESSAGE with patient name to the  Pool for scheduling with the following information:            -  Timing of next visit           -  Visit type ie Physical, OV, etc           -  Diagnoses/Reason ie. COPD, HTN - Do not use MEDICATION, Follow-up or CHECK UP - Give reason for visit      Next Appointment:   Future Appointments   Date Time Provider Department Center   8/11/2025  8:30 AM Jimmy Young DO [facility]       Message sent to  to schedule appt with patient?  NO      Requested Prescriptions     Pending Prescriptions Disp Refills    metFORMIN (GLUCOPHAGE-XR) 500 MG extended release tablet [Pharmacy Med Name: METFORMIN HCL  MG TABLET] 360 tablet 1     Sig: TAKE 2 TABLETS BY MOUTH 2 TIMES A DAY WITH MEALS

## 2025-07-29 DIAGNOSIS — K22.70 BARRETT'S ESOPHAGUS WITHOUT DYSPLASIA: ICD-10-CM

## 2025-07-29 DIAGNOSIS — E11.9 TYPE 2 DIABETES MELLITUS WITHOUT COMPLICATION, WITHOUT LONG-TERM CURRENT USE OF INSULIN (HCC): ICD-10-CM

## 2025-07-29 RX ORDER — ATORVASTATIN CALCIUM 20 MG/1
20 TABLET, FILM COATED ORAL DAILY
Qty: 90 TABLET | Refills: 1 | Status: SHIPPED | OUTPATIENT
Start: 2025-07-29

## 2025-07-29 RX ORDER — OMEPRAZOLE 40 MG/1
CAPSULE, DELAYED RELEASE ORAL
Qty: 180 CAPSULE | Refills: 1 | Status: SHIPPED | OUTPATIENT
Start: 2025-07-29

## 2025-07-29 NOTE — TELEPHONE ENCOUNTER
.Refill Request     CONFIRM preferred pharmacy with the patient.    If Mail Order Rx - Pend for 90 day refill.      Last Seen: Last Seen Department: 2/7/2025  Last Seen by PCP: Visit date not found    Last Written: 12/13/24 90 with 1     If no future appointment scheduled:  Review the last OV with PCP and review information for follow-up visit,  Route STAFF MESSAGE with patient name to the  Pool for scheduling with the following information:            -  Timing of next visit           -  Visit type ie Physical, OV, etc           -  Diagnoses/Reason ie. COPD, HTN - Do not use MEDICATION, Follow-up or CHECK UP - Give reason for visit      Next Appointment:   Future Appointments   Date Time Provider Department Center   8/11/2025  8:30 AM Jimmy Young DO [facility]       Message sent to  to schedule appt with patient?  NO      Requested Prescriptions     Pending Prescriptions Disp Refills    atorvastatin (LIPITOR) 20 MG tablet [Pharmacy Med Name: ATORVASTATIN 20 MG TABLET] 90 tablet 1     Sig: TAKE 1 TABLET BY MOUTH DAILY

## 2025-07-29 NOTE — TELEPHONE ENCOUNTER
.Refill Request     CONFIRM preferred pharmacy with the patient.    If Mail Order Rx - Pend for 90 day refill.      Last Seen: Last Seen Department: 2/7/2025  Last Seen by PCP: 2/7/2025    Last Written: 2/3/25 180 with 1     If no future appointment scheduled:  Review the last OV with PCP and review information for follow-up visit,  Route STAFF MESSAGE with patient name to the  Pool for scheduling with the following information:            -  Timing of next visit           -  Visit type ie Physical, OV, etc           -  Diagnoses/Reason ie. COPD, HTN - Do not use MEDICATION, Follow-up or CHECK UP - Give reason for visit      Next Appointment:   Future Appointments   Date Time Provider Department Center   8/11/2025  8:30 AM Jimmy Young DO [facility]       Message sent to  to schedule appt with patient?  NO      Requested Prescriptions     Pending Prescriptions Disp Refills    omeprazole (PRILOSEC) 40 MG delayed release capsule [Pharmacy Med Name: OMEPRAZOLE DR 40 MG CAPSULE] 180 capsule 1     Sig: TAKE 1 CAPSULE BY MOUTH TWICE A DAY BEFORE MEALS

## 2025-09-05 ENCOUNTER — OFFICE VISIT (OUTPATIENT)
Dept: FAMILY MEDICINE CLINIC | Age: 70
End: 2025-09-05

## 2025-09-05 VITALS
DIASTOLIC BLOOD PRESSURE: 60 MMHG | BODY MASS INDEX: 34.96 KG/M2 | HEIGHT: 69 IN | WEIGHT: 236 LBS | HEART RATE: 49 BPM | OXYGEN SATURATION: 97 % | SYSTOLIC BLOOD PRESSURE: 114 MMHG

## 2025-09-05 DIAGNOSIS — E11.65 TYPE 2 DIABETES MELLITUS WITH HYPERGLYCEMIA, WITHOUT LONG-TERM CURRENT USE OF INSULIN (HCC): ICD-10-CM

## 2025-09-05 DIAGNOSIS — Z00.00 PREVENTATIVE HEALTH CARE: Primary | ICD-10-CM

## 2025-09-05 DIAGNOSIS — E78.2 MIXED HYPERLIPIDEMIA: ICD-10-CM

## 2025-09-05 DIAGNOSIS — K22.70 BARRETT'S ESOPHAGUS WITHOUT DYSPLASIA: ICD-10-CM

## 2025-09-05 RX ORDER — TRAMADOL HYDROCHLORIDE 50 MG/1
TABLET ORAL
COMMUNITY
Start: 2025-08-23

## 2025-09-05 RX ORDER — ASPIRIN 81 MG/1
81 TABLET ORAL PRN
COMMUNITY

## 2025-09-05 RX ORDER — CEFDINIR 300 MG/1
300 CAPSULE ORAL EVERY 12 HOURS
COMMUNITY
Start: 2025-01-28

## 2025-09-05 RX ORDER — OFLOXACIN 3 MG/ML
SOLUTION AURICULAR (OTIC)
COMMUNITY
Start: 2025-08-08

## 2025-09-05 RX ORDER — VITAMIN E 268 MG
400 CAPSULE ORAL DAILY
COMMUNITY

## 2025-09-05 ASSESSMENT — ENCOUNTER SYMPTOMS: BACK PAIN: 1

## (undated) DEVICE — GLOVE SURG SZ 65 L12IN FNGR THK94MIL STD WHT LTX FREE

## (undated) DEVICE — CATHETER IV 20GA L1.25IN PNK FEP SFTY STR HUB RADPQ DISP

## (undated) DEVICE — Z DISCONTINUED USE 2275686 GLOVE SURG SZ 8 L12IN FNGR THK13MIL WHT ISOLEX POLYISOPRENE

## (undated) DEVICE — SYRINGE BLB 50CC IRRIG PLIABLE FNGR FLNG GRAD FLSK DISP

## (undated) DEVICE — GOWN,REINFORCED,POLY,AURORA,XXLARGE,STR: Brand: MEDLINE

## (undated) DEVICE — SUTURE ETHLN SZ 4-0 L18IN NONABSORBABLE BLK L19MM PS-2 3/8 1667H

## (undated) DEVICE — 3M™ STERI-DRAPE™ U-DRAPE, LONG 1019: Brand: STERI-DRAPE™

## (undated) DEVICE — ZIMMER® STERILE DISPOSABLE TOURNIQUET CUFF WITH PLC, DUAL PORT, SINGLE BLADDER, 18 IN. (46 CM)

## (undated) DEVICE — SET GRAV VENT NVENT CK VLV 3 NDL FREE PRT 10 GTT

## (undated) DEVICE — SUTURE VCRL SZ 2-0 L18IN ABSRB UD CT-1 L36MM 1/2 CIR J839D

## (undated) DEVICE — SUTURE MCRYL SZ 4-0 L18IN ABSRB UD L19MM PS-2 3/8 CIR PRIM Y496G

## (undated) DEVICE — PAD CAST COTTON BLEND ST PKG 4INX4YD

## (undated) DEVICE — SET ADMIN PRIMING 7ML L30IN 7.35LB 20 GTT 2ND RLER CLMP

## (undated) DEVICE — SOLUTION IV 1000ML LAC RINGERS PH 6.5 INJ USP VIAFLX PLAS

## (undated) DEVICE — PENCIL ES L3M BTTN SWCH S STL HEX LOK BLDE ELECTRD HOLSTER

## (undated) DEVICE — SOLUTION IV IRRIG 500ML 0.9% SODIUM CHL 2F7123

## (undated) DEVICE — ELECTRODE NDL 2.8IN COAT VALLEYLAB

## (undated) DEVICE — ELECTRODE PT RET AD L9FT HI MOIST COND ADH HYDRGEL CORDED

## (undated) DEVICE — Z DISCONTINUED NO SUB IDED NEEDLE SUT SZ 00 3/8 CIR TAPR TIP TNSL STRL DISP

## (undated) DEVICE — PACK COOL L W14XL6.5IN 3 LAYR CONSTR SFT CLP CLSR 4 TIE

## (undated) DEVICE — 3M™ TEGADERM™ TRANSPARENT FILM DRESSING FRAME STYLE, 1624W, 2-3/8 IN X 2-3/4 IN (6 CM X 7 CM), 100/CT 4CT/CASE: Brand: 3M™ TEGADERM™

## (undated) DEVICE — ELECTRODE,ECG,STRESS,FOAM,3PK: Brand: MEDLINE

## (undated) DEVICE — 3M™ COBAN™ NL STERILE NON-LATEX SELF-ADHERENT WRAP, 2084S, 4 IN X 5 YD (10 CM X 4,5 M), 18 ROLLS/CASE: Brand: 3M™ COBAN™

## (undated) DEVICE — COMFO-TEX ELASTIC BANDAGE LATEX FREE, 4INX5YD: Brand: COMFO-TEX™

## (undated) DEVICE — GLOVE,SURG,SENSICARE,ALOE,LF,PF,7: Brand: MEDLINE

## (undated) DEVICE — COMFO-TEX ELASTIC BANDAGE LATEX FREE, 3INX5YD: Brand: COMFO-TEX™

## (undated) DEVICE — CHLORAPREP 26ML ORANGE

## (undated) DEVICE — SPLINT ORTH W4XL30IN LAYERED FBRGLS FOAM PD BRTH BK MOLD

## (undated) DEVICE — INTENDED FOR TISSUE SEPARATION, AND OTHER PROCEDURES THAT REQUIRE A SHARP SURGICAL BLADE TO PUNCTURE OR CUT.: Brand: BARD-PARKER ® STAINLESS STEEL BLADES

## (undated) DEVICE — PADDING CAST W6INXL4YD NONSTERILE COT RAYON MICROPLEATED

## (undated) DEVICE — 10FR FRAZIER SUCTION HANDLE: Brand: CARDINAL HEALTH

## (undated) DEVICE — PADDING CAST W4INXL4YD NONSTERILE COT RAYON MICROPLEATED

## (undated) DEVICE — Device

## (undated) DEVICE — MEDI-VAC NON-CONDUCTIVE SUCTION TUBING: Brand: CARDINAL HEALTH